# Patient Record
Sex: MALE | Race: WHITE | NOT HISPANIC OR LATINO | Employment: FULL TIME | URBAN - METROPOLITAN AREA
[De-identification: names, ages, dates, MRNs, and addresses within clinical notes are randomized per-mention and may not be internally consistent; named-entity substitution may affect disease eponyms.]

---

## 2017-01-08 ENCOUNTER — SUPERVISING PHYSICIAN REVIEW (OUTPATIENT)
Dept: URGENT CARE | Facility: PHYSICIAN GROUP | Age: 22
End: 2017-01-08

## 2017-06-01 ENCOUNTER — OCCUPATIONAL MEDICINE (OUTPATIENT)
Dept: URGENT CARE | Facility: CLINIC | Age: 22
End: 2017-06-01
Payer: COMMERCIAL

## 2017-06-01 VITALS
TEMPERATURE: 98.6 F | BODY MASS INDEX: 28.14 KG/M2 | OXYGEN SATURATION: 97 % | HEIGHT: 69 IN | HEART RATE: 68 BPM | DIASTOLIC BLOOD PRESSURE: 80 MMHG | SYSTOLIC BLOOD PRESSURE: 106 MMHG | RESPIRATION RATE: 16 BRPM | WEIGHT: 190 LBS

## 2017-06-01 DIAGNOSIS — Z02.1 PRE-EMPLOYMENT DRUG SCREENING: ICD-10-CM

## 2017-06-01 DIAGNOSIS — S61.412A LACERATION OF LEFT HAND WITHOUT FOREIGN BODY, INITIAL ENCOUNTER: ICD-10-CM

## 2017-06-01 LAB
AMP AMPHETAMINE: NORMAL
BREATH ALCOHOL COMMENT: NORMAL
COC COCAINE: NORMAL
INT CON NEG: NEGATIVE
INT CON POS: POSITIVE
MET METHAMPHETAMINES: NORMAL
OPI OPIATES: NORMAL
PCP PHENCYCLIDINE: NORMAL
POC BREATHALIZER: 0 PERCENT (ref 0–0.01)
POC DRUG COMMENT 753798-OCCUPATIONAL HEALTH: NEGATIVE
THC: NORMAL

## 2017-06-01 PROCEDURE — 12001 RPR S/N/AX/GEN/TRNK 2.5CM/<: CPT | Mod: 29 | Performed by: PHYSICIAN ASSISTANT

## 2017-06-01 PROCEDURE — 82075 ASSAY OF BREATH ETHANOL: CPT | Mod: 29 | Performed by: PHYSICIAN ASSISTANT

## 2017-06-01 PROCEDURE — 80305 DRUG TEST PRSMV DIR OPT OBS: CPT | Mod: 29 | Performed by: PHYSICIAN ASSISTANT

## 2017-06-01 NOTE — Clinical Note
"EMPLOYEE’S CLAIM FOR COMPENSATION/ REPORT OF INITIAL TREATMENT  FORM C-4    EMPLOYEE’S CLAIM - PROVIDE ALL INFORMATION REQUESTED   First Name  Ho Last Name  Donna Birthdate                    1995                Sex  male Claim Number   Home Address  [address] Age  21 y.o. 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 (if applicable)  Using a uni-bit to ream out a 3/4 inch hole to a 1 inch hole    How did this injury or occupational disease occur? (Be specific an answer in detail. Use additional sheet if necessary)  At 9:57Am I was using a uni-bit to ream a 3/4 inch hole to a 1 inch hole in a J-Box. Had my right hand on the drill and held the box with channel locks in my left, the bit grabbed the box and spun it out of my channel socks making contact through my glove and cut my hand below my left thumb knuckle.   If you believe that you have an occupational disease, when did you first have knowledge of the disability and it relationship to your employment?  N/A Witnesses to the Accident  N/A      Nature of Injury or Occupational " Disease  Laceration  Part(s) of Body Injured or Affected  Hand (L), ,     I certify that the above is true and correct to the best of my knowledge and that I have provided this information in order to obtain the benefits of [place]  I hereby authorize any physician, chiropractor, surgeon, practitioner, or other person, any hospital, including [facility] or [facility], any medical service organization, any insurance company, or other institution or organization to release to each other, any medical or other information, including benefits paid or payable, pertinent to this injury or disease, except information relative to diagnosis, treatment and/or counseling for AIDS, psychological conditions, alcohol or controlled substances, for which I must give specific authorization.  A Photostat of this authorization shall be as valid as the original.     Date   Place   Employee’s Signature   THIS REPORT MUST BE COMPLETED AND MAILED WITHIN 3 WORKING DAYS OF TREATMENT   Place  [facility]  Name of Facility  [facility]   Date  6/1/2017 Diagnosis  (S61.412A) Laceration of left hand without foreign body, initial encounter Is there evidence the injured employee was under the influence of alcohol and/or another controlled substance at the time of accident?   Hour  11:39 AM Description of Injury or Disease  The encounter diagnosis was Laceration of left hand without foreign body, initial encounter. No   Treatment  full duty at work wearing dressing or glove over area of laceration, wound care discussed, f/u in 11d for suture removal   Have you advised the patient to remain off work five days or more? No   X-Ray Findings      If Yes   From Date  To Date      From information given by the employee, together with medical evidence, can you directly connect this injury or occupational disease as  "job incurred?  Yes If No Full Duty  Yes Modified Duty      Is additional medical care by a physician indicated?  Yes If Modified Duty, Specify any Limitations / Restrictions      Do you know of any previous injury or disease contributing to this condition or occupational disease?                            No   Date  6/1/2017 Print Doctor’s Name Rick Zhang PA-C I certify the employer’s copy of  this form was mailed on:   Address  [address] Use [address]     [place]  [address]    Provider’s Tax ID Number  [id number]  Telephone  Dept: [phone]        e-RICK Lacey PA-C   e-Signature: Dr. Mehdi Delgadillo, Medical Director Degree  DOLORES        ORIGINAL-TREATING PHYSICIAN OR CHIROPRACTOR    PAGE 2-INSURER/TPA    PAGE 3-EMPLOYER    PAGE 4-EMPLOYEE             Form C-4 (rev10/07)              BRIEF DESCRIPTION OF RIGHTS AND BENEFITS  (Pursuant to [place] 616C.050)    Notice of Injury or Occupational Disease (Incident Report Form C-1): If an injury or occupational disease (OD) arises out of and in the  course of employment, you must provide written notice to your employer as soon as practicable, but no later than 7 days after the accident or  OD. Your employer shall maintain a sufficient supply of the required forms.    Claim for Compensation (Form C-4): If medical treatment is sought, the form C-4 is available at the place of initial treatment. A completed  \"Claim for Compensation\" (Form C-4) must be filed within 90 days after an accident or OD. The treating physician or chiropractor must,  within 3 working days after treatment, complete and mail to the employer, the employer's insurer and third-party , the Claim for  Compensation.    Medical Treatment: If you require medical treatment for your on-the-job injury or OD, you may be required to select a physician or  chiropractor from a list provided by your workers’ compensation insurer, if it has contracted " with an Organization for Managed Care (MCO) or  Preferred Provider Organization (PPO) or providers of health care. If your employer has not entered into a contract with an MCO or PPO, you  may select a physician or chiropractor from the Panel of Physicians and Chiropractors. Any medical costs related to your industrial injury or  OD will be paid by your insurer.    Temporary Total Disability (TTD): If your doctor has certified that you are unable to work for a period of at least 5 consecutive days, or 5  cumulative days in a 20-day period, or places restrictions on you that your employer does not accommodate, you may be entitled to TTD  compensation.    Temporary Partial Disability (TPD): If the wage you receive upon reemployment is less than the compensation for TTD to which you are  entitled, the insurer may be required to pay you TPD compensation to make up the difference. TPD can only be paid for a maximum of 24  months.    Permanent Partial Disability (PPD): When your medical condition is stable and there is an indication of a PPD as a result of your injury or  OD, within 30 days, your insurer must arrange for an evaluation by a rating physician or chiropractor to determine the degree of your PPD. The  amount of your PPD award depends on the date of injury, the results of the PPD evaluation and your age and wage.    Permanent Total Disability (PTD): If you are medically certified by a treating physician or chiropractor as permanently and totally disabled  and have been granted a PTD status by your insurer, you are entitled to receive monthly benefits not to exceed 66 2/3% of your average  monthly wage. The amount of your PTD payments is subject to reduction if you previously received a PPD award.    Vocational Rehabilitation Services: You may be eligible for vocational rehabilitation services if you are unable to return to the job due to a  permanent physical impairment or permanent restrictions as a result of  your injury or occupational disease.    Transportation and Per Joanne Reimbursement: You may be eligible for travel expenses and per joanne associated with medical treatment.    Reopening: You may be able to reopen your claim if your condition worsens after claim closure.    Appeal Process: If you disagree with a written determination issued by the insurer or the insurer does not respond to your request, you may  appeal to the Department of Administration, , by following the instructions contained in your determination letter. You must  appeal the determination within 70 days from the date of the determination letter at [address], [address], [place], [place]  [address], or [address], [address], [place], [place] [address]. If you disagree with the  decision, you may appeal to the  Department of Administration, . You must file your appeal within 30 days from the date of the  decision  letter at [address], [address], [place], [place] [address], or [address], [facility] [address], [place] [address]. If you  disagree with a decision of an , you may file a petition for judicial review with the District Court. You must do so within 30  days of the Appeal Officer’s decision. You may be represented by an  at your own expense or you may contact the [facility] for possible  representation.    [place]  for Injured Workers (NAIW): If you disagree with a  decision, you may request that NAIW represent you  without charge at an  Hearing. For information regarding denial of benefits, you may contact the [facility] at: [address], [address], [place] [address], [phone], or [address] [facility] [address], [place] [address], [phone]    To File a Complaint with the Division: If you wish to file a complaint with the  of the Division of Industrial  Relations (DIR),  please contact the Workers’ Compensation Section, [address], [address], [place], [place] [address], telephone [phone], or  [address], [address], [place], [place] [address], telephone [phone].    For assistance with Workers’ Compensation Issues: you may contact the Office of the [facility] Consumer Health Assistance, [address] [place] [address], [address], [place], [place] [address], Toll Free 1-470.230.9708, Web site: http://govcha.[facility].nv., E-mail  Yolanda@[facility].[email or web address].                                                                                                                                                                                                                                   __________________________________________________________________                                                                   _________________                Employee Name / Signature                                                                                                                                                       Date                                                                                                                                                                                                     D-2 (rev. 10/07)

## 2017-06-01 NOTE — PROGRESS NOTES
"Subjective:      Ho Thompson is a 21 y.o. male who presents with Work-Related Injury      DOI: today, 6/1/17  Pt notes today at work was drilling a hole in a metal box, he had his right hand on the drill and was holding the metal box w/ channel locks w/ left hand, the drill bit caught and spun the box out of the channel locks causing a laceration to pt's gloved left hand near left thumb; pt is right hand dominant. Pt notes full normal sensation and motion to left thumb. Denies PMH of left hand/left thumb surgery or prior injury. Denies other employment currently. Chart shows TDAP in 2012.      HPI    ROS       Objective:     /80 mmHg  Pulse 68  Temp(Src) 37 °C (98.6 °F)  Resp 16  Ht 1.753 m (5' 9.02\")  Wt 86.183 kg (190 lb)  BMI 28.05 kg/m2  SpO2 97%     Physical Exam    Gen: AOx3; Head: NC AT; Eyes: PERRLA/EOM; Lungs: NLR; Cardiac: RR by periph pulse exam; Left hand: 1.5cm clean linear laceration over dorsum of base of thumb, no edema/erythema/ecchymosis, full AROM and full resisted strength in flexion/extension of thumb; Neuro: normal sensation to light touch on ulnar/radial aspect of digit, brisk cap refill      Procedure: Laceration Repair   -Risks including bleeding, nerve damage, infection, and poor cosmetic outcome discussed at length. Benefits and alternatives discussed.   -Sterile technique throughout   -Local anesthesia with 2% lidocaine   -Closed with 3# 4-0 Nylon interrupted sutures with good wound approximation   -Polysporin and dressing placed   -Patient tolerated well      Assessment/Plan:     1. Laceration of left hand without foreign body, initial encounter  full duty at work wearing dressing or glove over area of laceration, wound care discussed, f/u in 11d for suture removal         "

## 2017-06-01 NOTE — Clinical Note
[facility]   [address]  [place] [address]  Phone: [phone] - Fax: [phone]        [facility] Progress Report and Disability Certification  Date of Service: 6/1/2017   No Show:  No  Date / Time of Next Visit: 6/12/2017   Claim Information   Patient Name: Ho Thompson  Claim Number:     Employer: [employer]  Date of Injury: 6/1/2017     Insurer / TPA: [employer]  ID / SSN:     Occupation: Electric Apprentice  Diagnosis: The encounter diagnosis was Laceration of left hand without foreign body, initial encounter.    Medical Information   Related to Industrial Injury? Yes    Subjective Complaints:  DOI: today, 6/1/17  Pt notes today at work was drilling a hole in a metal box, he had his right hand on the drill and was holding the metal box w/ channel locks w/ left hand, the drill bit caught and spun the box out of the channel locks causing a laceration to pt's gloved left hand near left thumb; pt is right hand dominant. Pt notes full normal sensation and motion to left thumb. Denies PMH of left hand/left thumb surgery or prior injury. Denies other employment currently. Chart shows TDAP in 2012.    Objective Findings: Gen: AOx3; Head: NC AT; Eyes: PERRLA/EOM; Lungs: NLR; Cardiac: RR by periph pulse exam; Left hand: 1.5cm clean linear laceration over dorsum of base of thumb, no edema/erythema/ecchymosis, full AROM and full resisted strength in flexion/extension of thumb; Neuro: normal sensation to light touch on ulnar/radial aspect of digit, brisk cap refill     Pre-Existing Condition(s):     Assessment:   Initial Visit    Status: Additional Care Required  Permanent Disability:No    Plan:   Comments:full duty at work wearing dressing or glove over area of laceration, wound care discussed, f/u in 11d for suture removal      Diagnostics:      Comments:  full duty at work wearing dressing or glove over area of laceration, wound care discussed,  f/u in 11d for suture removal     Disability Information   Status: Released to Full Duty    From:  6/1/2017  Through: 6/12/2017 Restrictions are: Temporary   Physical Restrictions   Sitting:    Standing:    Stooping:    Bending:      Squatting:    Walking:    Climbing:    Pushing:      Pulling:    Other:    Reaching Above Shoulder (L):   Reaching Above Shoulder (R):       Reaching Below Shoulder (L):    Reaching Below Shoulder (R):      Not to exceed Weight Limits   Carrying(hrs):   Weight Limit(lb):   Lifting(hrs):   Weight  Limit(lb):     Comments: full duty at work wearing dressing or glove over area of laceration, wound care discussed, f/u in 11d for suture removal     Repetitive Actions   Hands: i.e. Fine Manipulations from Grasping:     Feet: i.e. Operating Foot Controls:     Driving / Operate Machinery:     Physician Name: Rick Zhang PA-C Physician Signature: RICK Boston PA-C e-Signature: Dr. Mehdi Delgadillo, Medical Director   Clinic Name / Location: [facility] [address] [address] Clinic Phone Number: Dept: [phone]   Appointment Time: 10:45 Am Visit Start Time: 11:39 AM   Check-In Time:  10:48 Am Visit Discharge Time:  6/1/17   Original-Treating Physician or Chiropractor    Page 2-Insurer/TPA    Page 3-Employer    Page 4-Employee

## 2017-06-01 NOTE — MR AVS SNAPSHOT
"        Ho Thompson   2017 10:45 AM   Occupational Medicine   MRN: [id number]    Department:  [facility]   Dept Phone:  [phone]    Description:  Male : 1995   Provider:  Rick Zhang PA-C           Reason for Visit     Work-Related Injury LAC LT thumb DOI 2017 [employer].  PT was drilling a hole in a metal box when the drillbit got stuck and yanked the box out of his hand and caused it to spin and hit his thumb cutting him through his glove.      Allergies as of 2017     No Known Allergies      You were diagnosed with     Laceration of left hand without foreign body, initial encounter   [[id number]]         Vital Signs     Blood Pressure Pulse Temperature Respirations Height Weight    106/80 mmHg 68 37 °C (98.6 °F) 16 1.753 m (5' 9.02\") 86.183 kg (190 lb)    Body Mass Index Oxygen Saturation Smoking Status             28.05 kg/m2 97% Never Smoker          Basic Information     Date Of Birth Sex Race Ethnicity Preferred Language    1995 Male White Non- English      Your appointments     2017  5:00 PM   Workers Compensation with [facility]   [facility] ([facility])    [address] [address] [phone]              Problem List              ICD-10-CM Priority Class Noted - Resolved    Pre-syncope R55 High  2014 - Present    Vaso-vagal reaction R55 High  2014 - Present      Health Maintenance        Date Due Completion Dates    IMM HEP B VACCINE (1 of 3 - Primary Series) 1993 ---    IMM HEP A VACCINE (1 of 2 - Standard Series) 1994 ---    IMM HPV VACCINE (1 of 3 - Male 3 Dose Series) 2004 ---    IMM VARICELLA (CHICKENPOX) VACCINE (1 of 2 - 2 Dose Adolescent Series) 2006 ---    IMM DTaP/Tdap/Td Vaccine (1 - Tdap) 2012 ---            Current Immunizations     No immunizations on file.      Below and/or attached are the medications your provider expects you to " take. Review all of your home medications and newly ordered medications with your provider and/or pharmacist. Follow medication instructions as directed by your provider and/or pharmacist. Please keep your medication list with you and share with your provider. Update the information when medications are discontinued, doses are changed, or new medications (including over-the-counter products) are added; and carry medication information at all times in the event of emergency situations     Allergies:  No Known Allergies          Medications  Valid as of: June 01, 2017 - 12:25 PM    Generic Name Brand Name Tablet Size Instructions for use    Cetirizine HCl (Tab) ZYRTEC 10 MG Take  by mouth every day.        Ibuprofen   Take  by mouth.        .                 Medicines prescribed today were sent to:     [id number] - [place], [place] - [address]    [address] [place] [address]    Phone: [phone] Fax: [phone]    Open 24 Hours?: No      Medication refill instructions:       If your prescription bottle indicates you have medication refills left, it is not necessary to call your provider’s office. Please contact your pharmacy and they will refill your medication.    If your prescription bottle indicates you do not have any refills left, you may request refills at any time through one of the following ways: The online [employer] system (except Urgent Care), by calling your provider’s office, or by asking your pharmacy to contact your provider’s office with a refill request. Medication refills are processed only during regular business hours and may not be available until the next business day. Your provider may request additional information or to have a follow-up visit with you prior to refilling your medication.   *Please Note: Medication refills are assigned a new Rx number when refilled electronically. Your pharmacy may indicate that no refills were authorized even though a new prescription for the  same medication is available at the pharmacy. Please request the medicine by name with the pharmacy before contacting your provider for a refill.           MyChart Status: Patient Declined        Quit Tobacco Information     Do you want to quit using tobacco?    Quitting tobacco decreases risks of cancer, heart and lung disease, increases life expectancy, improves sense of taste and smell, and increases spending money, among other benefits.    If you are thinking about quitting, we can help.  • [facility] Quit Tobacco Program: 433.207.6243  o Program occurs weekly for four weeks and includes pharmacist consultation on products to support quitting smoking or chewing tobacco. A provider referral is needed for pharmacist consultation.  • Tobacco Users Help Hotline: 3-482-QUIT-NOW (408-5914) or https://[place].quitlogix.org/  o Free, confidential telephone and online coaching for [place] residents. Sessions are designed on a schedule that is convenient for you. Eligible clients receive free nicotine replacement therapy.  • Nationally: www.smokefree.gov  o Information and professional assistance to support both immediate and long-term needs as you become, and remain, a non-smoker. Smokefree.gov allows you to choose the help that best fits your needs.

## 2017-06-12 ENCOUNTER — OCCUPATIONAL MEDICINE (OUTPATIENT)
Dept: URGENT CARE | Facility: CLINIC | Age: 22
End: 2017-06-12
Payer: COMMERCIAL

## 2017-06-12 VITALS
DIASTOLIC BLOOD PRESSURE: 84 MMHG | HEART RATE: 78 BPM | OXYGEN SATURATION: 98 % | WEIGHT: 190 LBS | RESPIRATION RATE: 16 BRPM | HEIGHT: 69 IN | BODY MASS INDEX: 28.14 KG/M2 | TEMPERATURE: 98.1 F | SYSTOLIC BLOOD PRESSURE: 124 MMHG

## 2017-06-12 DIAGNOSIS — S61.412A LACERATION OF LEFT HAND WITHOUT FOREIGN BODY, INITIAL ENCOUNTER: ICD-10-CM

## 2017-06-12 PROCEDURE — 99202 OFFICE O/P NEW SF 15 MIN: CPT | Performed by: FAMILY MEDICINE

## 2017-06-12 ASSESSMENT — ENCOUNTER SYMPTOMS
NAUSEA: 0
CHILLS: 0
VOMITING: 0
SHORTNESS OF BREATH: 0
FEVER: 0

## 2017-06-12 NOTE — MR AVS SNAPSHOT
"        Ho Thompson   2017 5:00 PM   Occupational Medicine   MRN: [id number]    Department:  [facility]   Dept Phone:  [phone]    Description:  Male : 1995   Provider:  Jones Felder M.D.           Reason for Visit     Work-Related Injury WC F/V       Allergies as of 2017     No Known Allergies      You were diagnosed with     Laceration of left hand without foreign body, initial encounter   [605803]         Vital Signs     Blood Pressure Pulse Temperature Respirations Height Weight    124/84 mmHg 78 36.7 °C (98.1 °F) 16 1.753 m (5' 9\") 86.183 kg (190 lb)    Body Mass Index Oxygen Saturation Smoking Status             28.05 kg/m2 98% Never Smoker          Basic Information     Date Of Birth Sex Race Ethnicity Preferred Language    1995 Male White Non- English      Problem List              ICD-10-CM Priority Class Noted - Resolved    Pre-syncope R55 High  2014 - Present    Vaso-vagal reaction R55 High  2014 - Present      Health Maintenance        Date Due Completion Dates    IMM HEP B VACCINE (1 of 3 - Primary Series) 1993 ---    IMM HEP A VACCINE (1 of 2 - Standard Series) 1994 ---    IMM HPV VACCINE (1 of 3 - Male 3 Dose Series) 2004 ---    IMM VARICELLA (CHICKENPOX) VACCINE (1 of 2 - 2 Dose Adolescent Series) 2006 ---    IMM DTaP/Tdap/Td Vaccine (1 - Tdap) 2012 ---            Current Immunizations     No immunizations on file.      Below and/or attached are the medications your provider expects you to take. Review all of your home medications and newly ordered medications with your provider and/or pharmacist. Follow medication instructions as directed by your provider and/or pharmacist. Please keep your medication list with you and share with your provider. Update the information when medications are discontinued, doses are changed, or new medications (including over-the-counter products) are added; and carry medication " information at all times in the event of emergency situations     Allergies:  No Known Allergies          Medications  Valid as of: June 12, 2017 -  5:09 PM    Generic Name Brand Name Tablet Size Instructions for use    Cetirizine HCl (Tab) ZYRTEC 10 MG Take  by mouth every day.        Ibuprofen   Take  by mouth.        .                 Medicines prescribed today were sent to:     [facility]Y #[id number] - [place], [place] - [address]    [address] [place] [address]    Phone: [phone] Fax: [phone]    Open 24 Hours?: No      Medication refill instructions:       If your prescription bottle indicates you have medication refills left, it is not necessary to call your provider’s office. Please contact your pharmacy and they will refill your medication.    If your prescription bottle indicates you do not have any refills left, you may request refills at any time through one of the following ways: The online [employer] system (except Urgent Care), by calling your provider’s office, or by asking your pharmacy to contact your provider’s office with a refill request. Medication refills are processed only during regular business hours and may not be available until the next business day. Your provider may request additional information or to have a follow-up visit with you prior to refilling your medication.   *Please Note: Medication refills are assigned a new Rx number when refilled electronically. Your pharmacy may indicate that no refills were authorized even though a new prescription for the same medication is available at the pharmacy. Please request the medicine by name with the pharmacy before contacting your provider for a refill.           MyChart Status: Patient Declined        Quit Tobacco Information     Do you want to quit using tobacco?    Quitting tobacco decreases risks of cancer, heart and lung disease, increases life expectancy, improves sense of taste and smell, and increases spending money, among  other benefits.    If you are thinking about quitting, we can help.  • [facility] Quit Tobacco Program: 864.814.4577  o Program occurs weekly for four weeks and includes pharmacist consultation on products to support quitting smoking or chewing tobacco. A provider referral is needed for pharmacist consultation.  • Tobacco Users Help Hotline: 1-800-QUIT-NOW (858-7684) or https://[place].quitlogix.org/  o Free, confidential telephone and online coaching for [place] residents. Sessions are designed on a schedule that is convenient for you. Eligible clients receive free nicotine replacement therapy.  • Nationally: www.smokefree.gov  o Information and professional assistance to support both immediate and long-term needs as you become, and remain, a non-smoker. Smokefree.gov allows you to choose the help that best fits your needs.

## 2017-06-12 NOTE — Clinical Note
[facility] [id number] [place] [address]  Phone: [phone] - Fax: [phone]        [facility] Progress Report and Disability Certification  Date of Service: 6/12/2017   No Show:  No  Date / Time of Next Visit:     Claim Information   Patient Name: Ho Thompson  Claim Number:     Employer: [employer] Date of Injury: 6/1/2017     Insurer / TPA: [employer]  ID / SSN:     Occupation: Electric Apprentice  Diagnosis: The encounter diagnosis was Laceration of left hand without foreign body, initial encounter.    Medical Information   Related to Industrial Injury? Yes    Subjective Complaints:  Removal of sutures, healed well   Objective Findings: Hand neurovascularly intact, wound healed   Pre-Existing Condition(s): n/a   Assessment:   Condition Improved    Status: Discharged /  MMI  Permanent Disability:No    Plan:   Comments:wound care, bandaid for 24-48 hrs while working    Diagnostics:      Comments:  Healed well    Disability Information   Status: Released to Full Duty    From:     Through:   Restrictions are:     Physical Restrictions   Sitting:    Standing:    Stooping:    Bending:      Squatting:    Walking:    Climbing:    Pushing:      Pulling:    Other:    Reaching Above Shoulder (L):   Reaching Above Shoulder (R):       Reaching Below Shoulder (L):    Reaching Below Shoulder (R):      Not to exceed Weight Limits   Carrying(hrs):   Weight Limit(lb):   Lifting(hrs):   Weight  Limit(lb):     Comments:      Repetitive Actions   Hands: i.e. Fine Manipulations from Grasping:     Feet: i.e. Operating Foot Controls:     Driving / Operate Machinery:     Physician Name: Jones Felder M.D. Physician Signature: JONES Preciado M.D. e-Signature: Dr. Mehdi Delgadillo, Medical Director   Clinic Name / Location: [id number] [place] [address] Clinic Phone Number: Dept: [phone]   Appointment  Time: 5:00 Pm Visit Start Time: 4:37 PM   Check-In Time:  4:30 Pm Visit Discharge Time: 5:09 Pm   Original-Treating Physician or Chiropractor    Page 2-Insurer/TPA    Page 3-Employer    Page 4-Employee

## 2017-06-12 NOTE — PROGRESS NOTES
"Subjective:      Ho Thompson is a 21 y.o. male who presents with Work-Related Injury            Suture / Staple Removal  The sutures were placed 11 to 14 days ago. He tried nothing since the wound repair. The treatment provided significant relief. His temperature was unmeasured prior to arrival. There has been no drainage from the wound. There is no redness present. There is no swelling present. The pain has no pain. He has no difficulty moving the affected extremity or digit.       Review of Systems   Constitutional: Negative for fever and chills.   Respiratory: Negative for shortness of breath.    Cardiovascular: Negative for chest pain.   Gastrointestinal: Negative for nausea and vomiting.     PMH:  has a past medical history of Vaso-vagal reaction (9/30/2014).  MEDS:   Current outpatient prescriptions:   •  IBUPROFEN PO, Take  by mouth., Disp: , Rfl:   •  cetirizine (ZYRTEC) 10 MG TABS, Take  by mouth every day., Disp: , Rfl:   ALLERGIES: No Known Allergies  SURGHX: History reviewed. No pertinent past surgical history.  SOCHX:  reports that he has never smoked. His smokeless tobacco use includes Chew. He reports that he does not drink alcohol or use illicit drugs.  FH: Family history was reviewed, no pertinent findings to report       Objective:     /84 mmHg  Pulse 78  Temp(Src) 36.7 °C (98.1 °F)  Resp 16  Ht 1.753 m (5' 9\")  Wt 86.183 kg (190 lb)  BMI 28.05 kg/m2  SpO2 98%     Physical Exam   Constitutional: He appears well-developed.   Pulmonary/Chest: Effort normal.   Neurological: He is alert.   Skin: Skin is warm and dry. Laceration noted.        Psychiatric: He has a normal mood and affect.               Assessment/Plan:     1. Laceration of left hand without foreign body, initial encounter       Healed laceration of the left hand  Removal of 3 sutures without complication  Recommend wearing a Band-Aid for the next 2 days to avoid risk of infection    Also, mentioned issues with " numbness and tingling in the right hand which sounds like it may be cervical in nature since he has symptoms at night versus carpal tunnel syndrome  This is not work-related and he has seen a physical therapist at the [facility], advised him to work on some cervical spine exercises and perhaps consider a nighttime splint for his wrist to see if either of them helped his symptoms  Otherwise, he will need to reschedule since his right hand issue is not work-related

## 2017-08-29 ENCOUNTER — OFFICE VISIT (OUTPATIENT)
Dept: URGENT CARE | Facility: CLINIC | Age: 22
End: 2017-08-29
Payer: COMMERCIAL

## 2017-08-29 VITALS
WEIGHT: 185 LBS | TEMPERATURE: 99 F | OXYGEN SATURATION: 98 % | BODY MASS INDEX: 26.48 KG/M2 | HEART RATE: 70 BPM | DIASTOLIC BLOOD PRESSURE: 70 MMHG | HEIGHT: 70 IN | SYSTOLIC BLOOD PRESSURE: 128 MMHG

## 2017-08-29 DIAGNOSIS — M25.561 ACUTE PAIN OF RIGHT KNEE: ICD-10-CM

## 2017-08-29 DIAGNOSIS — G56.01 RIGHT CARPAL TUNNEL SYNDROME: ICD-10-CM

## 2017-08-29 PROCEDURE — 99214 OFFICE O/P EST MOD 30 MIN: CPT | Performed by: FAMILY MEDICINE

## 2017-08-29 PROCEDURE — L3807 WHFO W/O JOINTS PRE CST: HCPCS | Performed by: FAMILY MEDICINE

## 2017-08-29 RX ORDER — PREDNISONE 20 MG/1
TABLET ORAL
Qty: 9 TAB | Refills: 0 | Status: SHIPPED | OUTPATIENT
Start: 2017-08-29 | End: 2017-11-17

## 2017-08-29 NOTE — LETTER
August 29, 2017         Patient: Ho Thompson   YOB: 1995   Date of Visit: 8/29/2017           To Whom it May Concern:    Ho Thompson was seen in my clinic on 8/29/2017.     Please excuse from school for 8/29/17 due to medical condition.    If you have any questions or concerns, please don't hesitate to call.        Sincerely,           Lemuel Catalan M.D.  Electronically Signed

## 2017-08-30 NOTE — PROGRESS NOTES
"Chief Complaint:    Chief Complaint   Patient presents with   • Knee Pain     \"does not know why\"x1week   • Wrist Pain     numbness on and off for 3years        History of Present Illness:    These are new problems.    For 1 week, has pain in right knee. Feels stiff, especially when going up stairs. No injury or trauma. Tried Ibuprofen with sub-optimal help.    For 3 years, has intermittent pain in right wrist and numbness and tingling feeling in right 1st-4th fingers. Notices more when wakes up in AM.    Does physical work - construction.      Review of Systems:    Constitutional: Negative for fever, chills, and diaphoresis.   Eyes: Negative for change in vision, photophobia, pain, redness, and discharge.  ENT: Negative for ear pain, ear discharge, hearing loss, tinnitus, nasal congestion, nosebleeds, and sore throat.    Respiratory: Negative for cough, hemoptysis, sputum production, shortness of breath, wheezing, and stridor.    Cardiovascular: Negative for chest pain, palpitations, orthopnea, claudication, leg swelling, and PND.   Gastrointestinal: Negative for abdominal pain, nausea, vomiting, diarrhea, constipation, blood in stool, and melena.   Genitourinary: Negative for dysuria, urinary urgency, urinary frequency, hematuria, and flank pain.   Musculoskeletal: See HPI.   Skin: Negative for rash and itching.   Neurological: Negative for dizziness, tingling, tremors, sensory change, speech change, focal weakness, seizures, loss of consciousness, and headaches.   Endo: Negative for polydipsia.   Heme: Does not bruise/bleed easily.   Psychiatric/Behavioral: Negative for depression, suicidal ideas, hallucinations, memory loss and substance abuse. The patient is not nervous/anxious and does not have insomnia.      Past Medical History:    Past Medical History:   Diagnosis Date   • Vaso-vagal reaction 9/30/2014       Past Surgical History:    No past surgical history on file.    Social History:    Social History " "    Social History   • Marital status: Single     Spouse name: N/A   • Number of children: N/A   • Years of education: N/A     Social History Main Topics   • Smoking status: Never Smoker   • Smokeless tobacco: Former User     Types: Chew   • Alcohol use No   • Drug use: No   • Sexual activity: Yes     Other Topics Concern   • Not on file     Social History Narrative   • No narrative on file       Family History:    History reviewed. No pertinent family history.    Medications:    Current Outpatient Prescriptions on File Prior to Visit   Medication Sig Dispense Refill   • cetirizine (ZYRTEC) 10 MG TABS Take  by mouth every day.     • IBUPROFEN PO Take  by mouth.       No current facility-administered medications on file prior to visit.        Allergies:    No Known Allergies      Vitals:    Vitals:    08/29/17 1706   BP: 128/70   Pulse: 70   Temp: 37.2 °C (99 °F)   SpO2: 98%   Weight: 83.9 kg (185 lb)   Height: 1.778 m (5' 10\")       Physical Exam:    Constitutional: Vital signs reviewed. Appears well-developed and well-nourished. No acute distress.   Eyes: Sclera white, conjunctivae clear.  ENT: External ears normal. Hearing normal.  Cardiovascular: Peripheral pulses 2+. No edema.  Pulmonary/Chest: Respirations non-labored.  Musculoskeletal: Mildly decreased right knee and right wrist range of motion due to pain. No tenderness to palpation. No muscular atrophy or weakness.  Neurological: Alert and oriented to person, place, and time. Muscle tone normal. Coordination normal. Light touch and sensation normal. Negative Tinel's and Phalen's signs.  Skin: No rashes or lesions. Warm, dry, normal turgor.  Psychiatric: Normal mood and affect. Behavior is normal. Judgment and thought content normal.       Assessment / Plan:    1. Right carpal tunnel syndrome  - predniSONE (DELTASONE) 20 MG Tab; 2 TABS ONCE A DAY ON DAYS 1-3, 1 TAB ONCE A DAY ON DAYS 4-6. TAKE WITH FOOD.  Dispense: 9 Tab; Refill: 0    2. Acute pain of right " knee  - predniSONE (DELTASONE) 20 MG Tab; 2 TABS ONCE A DAY ON DAYS 1-3, 1 TAB ONCE A DAY ON DAYS 4-6. TAKE WITH FOOD.  Dispense: 9 Tab; Refill: 0      Discussed with him DDX and management options.    Carpal Tunnel Syndrome info given and discussed.    Rec'd relative rest.    Right wrist splint provided to use prn. Advised to take off occl to prevent stiffness and get ROM in wrist.    Declines Toradol IM.    Agreeable to medication prescribed for anti-inflammatory effect.    He has a PPO and he will probably see [facility] if not better with above or any persisting problems.    May return to urgent care if needed.

## 2017-11-17 ENCOUNTER — OFFICE VISIT (OUTPATIENT)
Dept: URGENT CARE | Facility: CLINIC | Age: 22
End: 2017-11-17
Payer: COMMERCIAL

## 2017-11-17 VITALS
DIASTOLIC BLOOD PRESSURE: 84 MMHG | TEMPERATURE: 98.4 F | OXYGEN SATURATION: 98 % | SYSTOLIC BLOOD PRESSURE: 122 MMHG | BODY MASS INDEX: 27.92 KG/M2 | HEART RATE: 82 BPM | WEIGHT: 195 LBS | HEIGHT: 70 IN

## 2017-11-17 DIAGNOSIS — H10.32 ACUTE CONJUNCTIVITIS OF LEFT EYE, UNSPECIFIED ACUTE CONJUNCTIVITIS TYPE: ICD-10-CM

## 2017-11-17 PROCEDURE — 99214 OFFICE O/P EST MOD 30 MIN: CPT | Performed by: NURSE PRACTITIONER

## 2017-11-17 RX ORDER — POLYMYXIN B SULFATE AND TRIMETHOPRIM 1; 10000 MG/ML; [USP'U]/ML
1 SOLUTION OPHTHALMIC 4 TIMES DAILY
Qty: 1 BOTTLE | Refills: 0 | Status: SHIPPED | OUTPATIENT
Start: 2017-11-17 | End: 2017-11-22

## 2017-11-18 NOTE — PROGRESS NOTES
Chief Complaint   Patient presents with   • Eye Problem     X today, Left eye, Red, Discharge        HISTORY OF PRESENT ILLNESS: Patient is a 22 y.o. male who presents to urgent care today with complaints of left eye redness and irritation today. Admits to associated tearing and crusting. He denies eye pain, photophobia, changes in vision, or foreign body sensation. He does admit feeling something fly into his eye five days ago, was irritated, but that only lasted a short time, he has not had symptoms since. He has tried OTC eye drops today for symptom relief. He does not wear contacts or glasses. He denies URI symptoms.       Patient Active Problem List    Diagnosis Date Noted   • Pre-syncope 09/30/2014     Priority: High   • Vaso-vagal reaction 09/30/2014     Priority: High       Allergies:Patient has no known allergies.    Current Outpatient Prescriptions Ordered in [facility]   Medication Sig Dispense Refill   • cetirizine (ZYRTEC) 10 MG TABS Take  by mouth every day.     • IBUPROFEN PO Take  by mouth.       No current Epic-ordered facility-administered medications on file.        Past Medical History:   Diagnosis Date   • Vaso-vagal reaction 9/30/2014       Social History   Substance Use Topics   • Smoking status: Never Smoker   • Smokeless tobacco: Former User     Types: Chew   • Alcohol use No       No family status information on file.   History reviewed. No pertinent family history.    ROS:  Review of Systems   Constitutional: Negative for fever, chills, weight loss, malaise, and fatigue.   HENT: Negative for ear pain, nosebleeds, congestion, sore throat and neck pain.    Eyes: Positive for left eye redness, irritation, drainage, and crusting today. Negative for vision changes, photophobia, eye pain, or foreign body sensation.   Neuro: Negative for headache, sensory changes, weakness, seizure, LOC.   Cardiovascular: Negative for chest pain, palpitations, orthopnea and leg swelling.   Respiratory: Negative for  "cough, sputum production, shortness of breath and wheezing.   Gastrointestinal: Negative for abdominal pain, nausea, vomiting or diarrhea.   Musculoskeletal: Negative for falls, neck pain, back pain, joint pain, myalgias.   Skin: Negative for rash, diaphoresis.     Exam:  Blood pressure 122/84, pulse 82, temperature 36.9 °C (98.4 °F), height 1.778 m (5' 10\"), weight 88.5 kg (195 lb), SpO2 98 %.  General: well-nourished, well-developed male in NAD  Head: normocephalic, atraumatic  Eyes: PERRLA, acuity grossly intact, lids normal. Left conjunctiva is mildly injected with scant amount of crusting noted to lashes. Florescence exam is negative for uptake.  Ears: normal shape and symmetry, no tenderness, no discharge. External canals are without any significant edema or erythema. Tympanic membranes are without any inflammation, no effusion. Gross auditory acuity is intact.  Nose: symmetrical without tenderness, no discharge.  Mouth/Throat: reasonable hygiene, no erythema, exudates or tonsillar enlargement.  Neck: no masses, range of motion within normal limits, no tracheal deviation. No obvious thyroid enlargement.   Lymph: no cervical adenopathy. No supraclavicular adenopathy.   Neuro: alert and oriented. Cranial nerves 1-12 grossly intact. No sensory deficit.   Cardiovascular: regular rate and rhythm. No edema.  Pulmonary: no distress. Chest is symmetrical with respiration, no wheezes, crackles, or rhonchi.   Musculoskeletal: no clubbing, appropriate muscle tone, gait is stable.  Skin: warm, dry, intact, no clubbing, no cyanosis, no rashes.   Psych: appropriate mood, affect, judgement.         Assessment/Plan:  1. Acute conjunctivitis of left eye, unspecified acute conjunctivitis type  polymixin-trimethoprim (POLYTRIM) 60652-5.1 UNIT/ML-% Solution       No evidence of corneal abrasion or foreign body. Polytrim as directed for suspected conjunctivitis.  Supportive care, differential diagnoses, and indications for " immediate follow-up discussed with patient.   Pathogenesis of diagnosis discussed including typical length and natural progression.   Instructed to return to clinic or nearest emergency department for any change in condition, further concerns, or worsening of symptoms.  Patient states understanding of the plan of care and discharge instructions.  Instructed to make an appointment, for follow up, with their primary care provider.        Please note that this dictation was created using voice recognition software. I have made every reasonable attempt to correct obvious errors, but I expect that there are errors of grammar and possibly content that I did not discover before finalizing the note.      BERTHA Anderson.

## 2020-05-05 ENCOUNTER — HOSPITAL ENCOUNTER (OUTPATIENT)
Dept: LAB | Facility: MEDICAL CENTER | Age: 25
End: 2020-05-05
Attending: OBSTETRICS & GYNECOLOGY
Payer: COMMERCIAL

## 2020-05-06 ENCOUNTER — HOSPITAL ENCOUNTER (OUTPATIENT)
Facility: MEDICAL CENTER | Age: 25
End: 2020-05-06
Attending: OBSTETRICS & GYNECOLOGY
Payer: COMMERCIAL

## 2020-05-06 PROCEDURE — 36415 COLL VENOUS BLD VENIPUNCTURE: CPT

## 2020-05-06 PROCEDURE — 88262 CHROMOSOME ANALYSIS 15-20: CPT

## 2020-05-15 LAB
KARYOTYP BLD/T: NORMAL
PATHOLOGY STUDY: NORMAL

## 2020-11-30 ENCOUNTER — HOSPITAL ENCOUNTER (EMERGENCY)
Facility: MEDICAL CENTER | Age: 25
End: 2020-11-30
Attending: EMERGENCY MEDICINE
Payer: COMMERCIAL

## 2020-11-30 VITALS
RESPIRATION RATE: 15 BRPM | SYSTOLIC BLOOD PRESSURE: 143 MMHG | HEART RATE: 70 BPM | BODY MASS INDEX: 28.64 KG/M2 | OXYGEN SATURATION: 97 % | HEIGHT: 72 IN | WEIGHT: 211.42 LBS | TEMPERATURE: 97.8 F | DIASTOLIC BLOOD PRESSURE: 101 MMHG

## 2020-11-30 DIAGNOSIS — Z77.098 EXPOSURE TO CHEMICAL INHALATION: ICD-10-CM

## 2020-11-30 PROCEDURE — 99282 EMERGENCY DEPT VISIT SF MDM: CPT

## 2020-11-30 NOTE — LETTER
FORM C-4:  EMPLOYEE’S CLAIM FOR COMPENSATION/ REPORT OF INITIAL TREATMENT  EMPLOYEE’S CLAIM - PROVIDE ALL INFORMATION REQUESTED   First Name Ho Last Name Donna Birthdate 1995  Sex male Claim Number   Home Address [address]   [facility]             [place] [address]                                   Age  25 y.o. Height  1.829 m (6') Weight  95.9 kg (211 lb 6.7 oz) [facility]     Mailing Address [address]  [facility]              [place] [address] Telephone  [phone] (home)  Primary Language Spoken  English   Insurer   Third Party   [employer] Employee's Occupation (Job Title) When Injury or Occupational Disease Occurred     Employer's Name [employer] Telephone [phone]    Employer Address [address] [facility] [[address]] Zip [address]   Date of Injury  11/30/20       Hour of Injury  10:50AM Date Employer Notified  11/30/2020 Last Day of Work after Injury or Occupational Disease  11/30/20 Supervisor to Whom Injury Reported  Larry Wisdom   Address or Location of Accident (if applicable) [address] [place] [address]   What were you doing at the time of accident? (if applicable)  Pulling out wire from old panel's near accident   How did this injury or occupational disease occur? Be specific and answer in detail. Use additional sheet if necessary)  Chemical from an improper disposal of an old refridgerator. Myself + coworkers could smell fuses + could feel burning in the eye's +throat. Vaccated scene immediately but still togete the burn + coughing.    If you believe that you have an occupational disease, when did you first have knowledge of the disability and it relationship to your employment?  Witnesses to the Accident  Cornelio Carr   Nature of Injury or Occupational Disease  Refridgerator chemical exposure Part(s) of Body Injured or Affected  , , Eye's throat Lungs   I  CERTIFY THAT THE ABOVE IS TRUE AND CORRECT TO THE BEST OF MY KNOWLEDGE AND THAT I HAVE PROVIDED THIS INFORMATION IN ORDER TO OBTAIN THE BENEFITS OF [place] CHAPTER 617 OF [place]).  I HEREBY AUTHORIZE ANY PHYSICIAN, CHIROPRACTOR, SURGEON, PRACTITIONER, OR OTHER PERSON, ANY HOSPITAL, INCLUDING [facility] OR [facility], ANY MEDICAL SERVICE ORGANIZATION, ANY INSURANCE COMPANY, OR OTHER INSTITUTION OR ORGANIZATION TO RELEASE TO EACH OTHER, ANY MEDICAL OR OTHER INFORMATION, INCLUDING BENEFITS PAID OR PAYABLE, PERTINENT TO THIS INJURY OR DISEASE, EXCEPT INFORMATION RELATIVE TO DIAGNOSIS, TREATMENT AND/OR COUNSELING FOR AIDS, PSYCHOLOGICAL CONDITIONS, ALCOHOL OR CONTROLLED SUBSTANCES, FOR WHICH I MUST GIVE SPECIFIC AUTHORIZATION.  A PHOTOSTAT OF THIS AUTHORIZATION SHALL BE AS VALID AS THE ORIGINAL.  Date                                      Place                                                                             Employee’s Signature   THIS REPORT MUST BE COMPLETED AND MAILED WITHIN 3 WORKING DAYS OF TREATMENT   Place [facility], EMERGENCY DEPT                       Name of Facility [facility]   Date  11/30/2020 Diagnosis  No diagnosis found. Is there evidence the injured employee was under the influence of alcohol and/or another controlled substance at the time of accident?   Hour  1:48 PM Description of Injury or Disease       Treatment     Have you advised the patient to remain off work five days or more?             X-Ray Findings    If Yes   From Date    To Date      From information given by the employee, together with medical evidence, can you directly connect this injury or occupational disease as job incurred?   If No, is employee capable of: Full Duty    Modified Duty      Is additional medical care by a physician indicated?   If Modified Duty, Specify  "any Limitations / Restrictions       Do you know of any previous injury or disease contributing to this condition or occupational disease?      Date 11/30/2020 Print Doctor’s Name   I certify the employer’s copy of this form was mailed on:   Address [address] [id number] [phone] INSURER’S USE ONLY   Provider’s Tax ID Number [id number] Telephone Dept: [phone]    Doctor’s Signature   Degree        Form C-4 (rev.10/07)                                                                         BRIEF DESCRIPTION OF RIGHTS AND BENEFITS  ([place]ursuant to [place] 616C.050)    Notice of Injury or Occupational Disease (Incident Report Form C-1): If an injury or occupational disease (OD) arises out of and in the course of employment, you must provide written notice to your employer as soon as practicable, but no later than 7 days after the accident or OD. Your employer shall maintain a sufficient supply of the required forms.    Claim for Compensation (Form C-4): If medical treatment is sought, the form C-4 is available at the place of initial treatment. A completed \"Claim for Compensation\" (Form C-4) must be filed within 90 days after an accident or OD. The treating physician or chiropractor must, within 3 working days after treatment, complete and mail to the employer, the employer's insurer and third-party , the Claim for Compensation.    Medical Treatment: If you require medical treatment for your on-the-job injury or OD, you may be required to select a physician or chiropractor from a list provided by your workers’ compensation insurer, if it has contracted with an Organization for Managed Care (MCO) or Preferred Provider Organization (PPO) or providers of health care. If your employer has not entered into a contract with an MCO or PPO, you may select a physician or chiropractor from the Panel of Physicians and Chiropractors. Any medical costs related to your industrial injury or OD will be " paid by your insurer.    Temporary Total Disability (TTD): If your doctor has certified that you are unable to work for a period of at least 5 consecutive days, or 5 cumulative days in a 20-day period, or places restrictions on you that your employer does not accommodate, you may be entitled to TTD compensation.    Temporary Partial Disability (TPD): If the wage you receive upon reemployment is less than the compensation for TTD to which you are entitled, the insurer may be required to pay you TPD compensation to make up the difference. TPD can only be paid for a maximum of 24 months.    Permanent Partial Disability (PPD): When your medical condition is stable and there is an indication of a PPD as a result of your injury or OD, within 30 days, your insurer must arrange for an evaluation by a rating physician or chiropractor to determine the degree of your PPD. The amount of your PPD award depends on the date of injury, the results of the PPD evaluation and your age and wage.    Permanent Total Disability (PTD): If you are medically certified by a treating physician or chiropractor as permanently and totally disabled and have been granted a PTD status by your insurer, you are entitled to receive monthly benefits not to exceed 66 2/3% of your average monthly wage. The amount of your PTD payments is subject to reduction if you previously received a PPD award.    Vocational Rehabilitation Services: You may be eligible for vocational rehabilitation services if you are unable to return to the job due to a permanent physical impairment or permanent restrictions as a result of your injury or occupational disease.    Transportation and Per Joanne Reimbursement: You may be eligible for travel expenses and per joanne associated with medical treatment.    Reopening: You may be able to reopen your claim if your condition worsens after claim closure.     Appeal Process: If you disagree with a written determination issued by the  insurer or the insurer does not respond to your request, you may appeal to the Department of Administration, , by following the instructions contained in your determination letter. You must appeal the determination within 70 days from the date of the determination letter at [address], [address], [place], [place] [address], or [address], [address], [place], [place] [address]. If you disagree with the  decision, you may appeal to the Department of Administration, . You must file your appeal within 30 days from the date of the  decision letter at [address], [address], [place], [place] [address], or [address], [address], [place], [place] [address]. If you disagree with a decision of an , you may file a petition for judicial review with the District Court. You must do so within 30 days of the Appeal Officer’s decision. You may be represented by an  at your own expense or you may contact the [facility] for possible representation.    [place]  for Injured Workers (NAIW): If you disagree with a  decision, you may request that NAIW represent you without charge at an  Hearing. For information regarding denial of benefits, you may contact the [facility] at: [address], [address], [place] [address], [phone], or [address], [place] [address], [phone]    To File a Complaint with the Division: If you wish to file a complaint with the  of the Division of Industrial Relations (DIR),  please contact the Workers’ Compensation Section, [address] [facility], [facility] [address], [place], [place] [address], telephone [phone], or [address], [place], [place] [address], telephone [phone].    For assistance with Workers’ Compensation Issues: You may contact the Office of the Governor Consumer Health  Assistance, [address], [address], [place], [place] [address], Toll Free 1-372.674.6938, Web site: [email or web address]., E-mail [email or web address].  D-2 (rev. 06/18)              __________________________________________________________________                                    _________________            Employee Name / Signature                                                                                                                            Date

## 2020-11-30 NOTE — ED PROVIDER NOTES
ED Provider Note    CHIEF COMPLAINT  Chief Complaint   Patient presents with   • Chemical Exposure       HPI  Ho Thompson is a 25 y.o. male who presents to the emergency department after hydrocarbon exposure at work.  He is totally asymptomatic at this time.  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    SOCIAL HISTORY  Social History     Tobacco Use   • Smoking status: Never Smoker   • Smokeless tobacco: Former User     Types: Chew   Substance and Sexual Activity   • Alcohol use: No   • Drug use: No   • Sexual activity: Yes       SURGICAL HISTORY  patient denies any surgical history    CURRENT MEDICATIONS  Reviewed.  See Encounter Summary.  I    ALLERGIES  No Known Allergies    PHYSICAL EXAM  VITAL SIGNS: /102   Pulse 67   Temp 36.6 °C (97.9 °F) (Temporal)   Resp 18   Ht 1.829 m (6')   Wt 95.9 kg (211 lb 6.7 oz)   SpO2 97%   BMI 28.67 kg/m²   Constitutional: Pleasant, Alert in no apparent distress.  HENT: Normocephalic, Bilateral external ears normal. Nose normal.   Eyes: Pupils are equal and reactive. Conjunctiva normal, non-icteric.   Thorax & Lungs: Easy unlabored respirations  Abdomen:  No gross signs of peritonitis, no pain with movement   Skin: Visualized skin is  Dry, No erythema, No rash.   Extremities:   No edema, No asymmetry  Neurologic: Alert, Grossly non-focal.   Psychiatric: Affect and Mood normal      COURSE & MEDICAL DECISION MAKING  Nursing notes and vital signs were reviewed. (See chart for details)    The patient presents to the Emergency Department with chemical exposure, he has no evidence or signs of pneumonitis at this time.  I talked him about watching the signs and symptoms if he has any worsening symptoms he will return for further work-up.  At this time I think he is medically cleared to be  discharged home    FINAL IMPRESSION  1. Chemical exposure            Electronically signed by: Monique Bob M.D., 11/30/2020 2:58 PM

## 2020-11-30 NOTE — LETTER
FORM C-4:  EMPLOYEE’S CLAIM FOR COMPENSATION/ REPORT OF INITIAL TREATMENT  EMPLOYEE’S CLAIM - PROVIDE ALL INFORMATION REQUESTED   First Name Ho Last Name Donna Birthdate 1995  Sex male Claim Number   Home Address [address]   [facility]             [place] [address]                                   Age  25 y.o. 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 (if applicable)  Pulling out wire from old panel's near accident   How did this injury or occupational disease occur? Be specific and answer in detail. Use additional sheet if necessary)  We were exposed to a freon type chemical from an improper disposal of an old refrigerator. Myself+my coworkers could smell fuses + could feel burning in the eyes + throat. Vaccated scene immediately but still the burn and coughing   If you believe that you have an occupational disease, when did you first have knowledge of the disability and it relationship to your employment? NA Witnesses to the Accident  Cornelio Carr   Nature of Injury or Occupational Disease  Refridgerator chemical exposure Part(s) of Body Injured or Affected  , ,  Eye's throat  lungs   I CERTIFY THAT THE ABOVE IS TRUE AND CORRECT TO THE BEST OF MY KNOWLEDGE AND THAT I HAVE PROVIDED THIS INFORMATION IN ORDER TO OBTAIN THE BENEFITS OF [place], [place]IVE [place] CHAPTER 617 [place]).  I HEREBY AUTHORIZE ANY PHYSICIAN, CHIROPRACTOR, SURGEON, PRACTITIONER, OR OTHER PERSON, ANY HOSPITAL, INCLUDING [facility] OR [facility], ANY MEDICAL SERVICE ORGANIZATION, ANY INSURANCE COMPANY, OR OTHER INSTITUTION OR ORGANIZATION TO RELEASE TO EACH OTHER, ANY MEDICAL OR OTHER INFORMATION, INCLUDING BENEFITS PAID OR PAYABLE, PERTINENT TO THIS INJURY OR DISEASE, EXCEPT INFORMATION RELATIVE TO DIAGNOSIS, TREATMENT AND/OR COUNSELING FOR AIDS, PSYCHOLOGICAL CONDITIONS, ALCOHOL OR CONTROLLED SUBSTANCES, FOR WHICH I MUST GIVE SPECIFIC AUTHORIZATION.  A PHOTOSTAT OF THIS AUTHORIZATION SHALL BE AS VALID AS THE ORIGINAL.  Date                                      Place                                                                             Employee’s Signature   THIS REPORT MUST BE COMPLETED AND MAILED WITHIN 3 WORKING DAYS OF TREATMENT   Place [facility], EMERGENCY DEPT                       Name of Facility [facility]   Date  11/30/2020 Diagnosis  (Z77.098) Exposure to chemical inhalation Is there evidence the injured employee was under the influence of alcohol and/or another controlled substance at the time of accident?   Hour  3:48 PM Description of Injury or Disease  Exposure to chemical inhalation No   Treatment  Pulse ox, eval  Have you advised the patient to remain off work five days or more?         No   X-Ray Findings    If Yes   From Date    To Date      From information given by the employee, together with medical evidence, can you directly connect this injury or occupational disease as job incurred? Yes If No, is employee capable of: Full Duty  Yes Modified Duty       "  Is additional medical care by a physician indicated? No If Modified Duty, Specify any Limitations / Restrictions       Do you know of any previous injury or disease contributing to this condition or occupational disease? No    Date 11/30/2020 Print Doctor’s Name BobDaphne katz I certify the employer’s copy of this form was mailed on:   Address [address] [address]  [phone] INSURER’S USE ONLY   Provider’s Tax ID Number [id number] Telephone Dept: [phone]    Doctor’s Signature e-DAPHNE Concepcion M.D. Degree  MD      Form C-4 (rev.10/07)                                                                         BRIEF DESCRIPTION OF RIGHTS AND BENEFITS  ([place]ursuant to [place] 616C.050)    Notice of Injury or Occupational Disease (Incident Report Form C-1): If an injury or occupational disease (OD) arises out of and in the course of employment, you must provide written notice to your employer as soon as practicable, but no later than 7 days after the accident or OD. Your employer shall maintain a sufficient supply of the required forms.    Claim for Compensation (Form C-4): If medical treatment is sought, the form C-4 is available at the place of initial treatment. A completed \"Claim for Compensation\" (Form C-4) must be filed within 90 days after an accident or OD. The treating physician or chiropractor must, within 3 working days after treatment, complete and mail to the employer, the employer's insurer and third-party , the Claim for Compensation.    Medical Treatment: If you require medical treatment for your on-the-job injury or OD, you may be required to select a physician or chiropractor from a list provided by your workers’ compensation insurer, if it has contracted with an Organization for Managed Care (MCO) or Preferred Provider Organization (PPO) or providers of health care. If your employer has not entered into a contract with an MCO or PPO, you may select a " physician or chiropractor from the Panel of Physicians and Chiropractors. Any medical costs related to your industrial injury or OD will be paid by your insurer.    Temporary Total Disability (TTD): If your doctor has certified that you are unable to work for a period of at least 5 consecutive days, or 5 cumulative days in a 20-day period, or places restrictions on you that your employer does not accommodate, you may be entitled to TTD compensation.    Temporary Partial Disability (TPD): If the wage you receive upon reemployment is less than the compensation for TTD to which you are entitled, the insurer may be required to pay you TPD compensation to make up the difference. TPD can only be paid for a maximum of 24 months.    Permanent Partial Disability (PPD): When your medical condition is stable and there is an indication of a PPD as a result of your injury or OD, within 30 days, your insurer must arrange for an evaluation by a rating physician or chiropractor to determine the degree of your PPD. The amount of your PPD award depends on the date of injury, the results of the PPD evaluation and your age and wage.    Permanent Total Disability (PTD): If you are medically certified by a treating physician or chiropractor as permanently and totally disabled and have been granted a PTD status by your insurer, you are entitled to receive monthly benefits not to exceed 66 2/3% of your average monthly wage. The amount of your PTD payments is subject to reduction if you previously received a PPD award.    Vocational Rehabilitation Services: You may be eligible for vocational rehabilitation services if you are unable to return to the job due to a permanent physical impairment or permanent restrictions as a result of your injury or occupational disease.    Transportation and Per Joanne Reimbursement: You may be eligible for travel expenses and per joanne associated with medical treatment.    Reopening: You may be able to reopen  your claim if your condition worsens after claim closure.     Appeal Process: If you disagree with a written determination issued by the insurer or the insurer does not respond to your request, you may appeal to the Department of Administration, , by following the instructions contained in your determination letter. You must appeal the determination within 70 days from the date of the determination letter at [address], [address], [place], [place] [address], or [address], [place], [place] [address]. If you disagree with the  decision, you may appeal to the Department of Administration, . You must file your appeal within 30 days from the date of the  decision letter at [address], [address], [place], [place] [address], or [address], [place], [place] [address]. If you disagree with a decision of an , you may file a petition for judicial review with the District Court. You must do so within 30 days of the Appeal Officer’s decision. You may be represented by an  at your own expense or you may contact the [facility] for possible representation.    [place]  for Injured Workers ([place]IW): If you disagree with a  decision, you may request that NAIW represent you without charge at an  Hearing. For information regarding denial of benefits, you may contact the [facility] at: [address], [address], [place] [address], [phone], or [address], [place] [address], [phone]    To File a Complaint with the Division: If you wish to file a complaint with the  of the Division of Industrial Relations (DIR),  please contact the Workers’ Compensation Section, [address] [facility] [address], [place], [place] [address], telephone [phone]) [phone], or [address], [place], [place] [address],  telephone [phone].    For assistance with Workers’ Compensation Issues: You may contact the Office of the Governor Consumer Health Assistance, [address] [place] [address], [address], [place] [address], Toll Free 1-507.103.2821, Web site: http://[email or web address]., E-mail [email or web address]  D-2 (rev. 06/18)              __________________________________________________________________                                    _________________            Employee Name / Signature                                                                                                                            Date

## 2021-01-14 ENCOUNTER — APPOINTMENT (OUTPATIENT)
Dept: RADIOLOGY | Facility: MEDICAL CENTER | Age: 26
DRG: 639 | End: 2021-01-14
Attending: EMERGENCY MEDICINE
Payer: COMMERCIAL

## 2021-01-14 ENCOUNTER — HOSPITAL ENCOUNTER (INPATIENT)
Facility: MEDICAL CENTER | Age: 26
LOS: 3 days | DRG: 639 | End: 2021-01-17
Attending: EMERGENCY MEDICINE | Admitting: HOSPITALIST
Payer: COMMERCIAL

## 2021-01-14 DIAGNOSIS — E10.10 DIABETIC KETOACIDOSIS WITHOUT COMA ASSOCIATED WITH TYPE 1 DIABETES MELLITUS (HCC): ICD-10-CM

## 2021-01-14 LAB
ALBUMIN SERPL BCP-MCNC: 5 G/DL (ref 3.2–4.9)
ALBUMIN/GLOB SERPL: 1.3 G/DL
ALP SERPL-CCNC: 128 U/L (ref 30–99)
ALT SERPL-CCNC: 30 U/L (ref 2–50)
ANION GAP SERPL CALC-SCNC: 30 MMOL/L (ref 7–16)
APPEARANCE UR: CLEAR
AST SERPL-CCNC: 18 U/L (ref 12–45)
BACTERIA #/AREA URNS HPF: ABNORMAL /HPF
BASE EXCESS BLDV CALC-SCNC: -20 MMOL/L
BASOPHILS # BLD AUTO: 0.5 % (ref 0–1.8)
BASOPHILS # BLD: 0.05 K/UL (ref 0–0.12)
BILIRUB SERPL-MCNC: 0.4 MG/DL (ref 0.1–1.5)
BILIRUB UR QL STRIP.AUTO: NEGATIVE
BODY TEMPERATURE: ABNORMAL CENTIGRADE
BUN SERPL-MCNC: 12 MG/DL (ref 8–22)
CALCIUM SERPL-MCNC: 9.9 MG/DL (ref 8.4–10.2)
CHLORIDE SERPL-SCNC: 93 MMOL/L (ref 96–112)
CO2 SERPL-SCNC: 8 MMOL/L (ref 20–33)
COLOR UR: YELLOW
CREAT SERPL-MCNC: 1.13 MG/DL (ref 0.5–1.4)
EOSINOPHIL # BLD AUTO: 0 K/UL (ref 0–0.51)
EOSINOPHIL NFR BLD: 0 % (ref 0–6.9)
ERYTHROCYTE [DISTWIDTH] IN BLOOD BY AUTOMATED COUNT: 40.4 FL (ref 35.9–50)
FLUAV RNA SPEC QL NAA+PROBE: NEGATIVE
FLUBV RNA SPEC QL NAA+PROBE: NEGATIVE
GLOBULIN SER CALC-MCNC: 3.8 G/DL (ref 1.9–3.5)
GLUCOSE SERPL-MCNC: 580 MG/DL (ref 65–99)
GLUCOSE UR STRIP.AUTO-MCNC: 500 MG/DL
HCO3 BLDV-SCNC: 8 MMOL/L (ref 24–28)
HCT VFR BLD AUTO: 49.1 % (ref 42–52)
HGB BLD-MCNC: 17.3 G/DL (ref 14–18)
IMM GRANULOCYTES # BLD AUTO: 0.04 K/UL (ref 0–0.11)
IMM GRANULOCYTES NFR BLD AUTO: 0.4 % (ref 0–0.9)
KETONES UR STRIP.AUTO-MCNC: >=80 MG/DL
LEUKOCYTE ESTERASE UR QL STRIP.AUTO: NEGATIVE
LIPASE SERPL-CCNC: 32 U/L (ref 7–58)
LYMPHOCYTES # BLD AUTO: 1.05 K/UL (ref 1–4.8)
LYMPHOCYTES NFR BLD: 10.3 % (ref 22–41)
MCH RBC QN AUTO: 31.9 PG (ref 27–33)
MCHC RBC AUTO-ENTMCNC: 35.2 G/DL (ref 33.7–35.3)
MCV RBC AUTO: 90.4 FL (ref 81.4–97.8)
MICRO URNS: ABNORMAL
MONOCYTES # BLD AUTO: 0.47 K/UL (ref 0–0.85)
MONOCYTES NFR BLD AUTO: 4.6 % (ref 0–13.4)
MUCOUS THREADS #/AREA URNS HPF: ABNORMAL /HPF
NEUTROPHILS # BLD AUTO: 8.63 K/UL (ref 1.82–7.42)
NEUTROPHILS NFR BLD: 84.2 % (ref 44–72)
NITRITE UR QL STRIP.AUTO: NEGATIVE
NRBC # BLD AUTO: 0 K/UL
NRBC BLD-RTO: 0 /100 WBC
PCO2 BLDV: 27.7 MMHG (ref 41–51)
PH BLDV: 7.09 [PH] (ref 7.31–7.45)
PH UR STRIP.AUTO: 5.5 [PH] (ref 5–8)
PLATELET # BLD AUTO: 282 K/UL (ref 164–446)
PMV BLD AUTO: 9.9 FL (ref 9–12.9)
PO2 BLDV: 38.1 MMHG (ref 25–40)
POTASSIUM SERPL-SCNC: 4.7 MMOL/L (ref 3.6–5.5)
PROT SERPL-MCNC: 8.8 G/DL (ref 6–8.2)
PROT UR QL STRIP: 30 MG/DL
RBC # BLD AUTO: 5.43 M/UL (ref 4.7–6.1)
RBC # URNS HPF: ABNORMAL /HPF
RBC UR QL AUTO: ABNORMAL
RSV RNA SPEC QL NAA+PROBE: NEGATIVE
SAO2 % BLDV: 66.7 %
SARS-COV-2 RNA RESP QL NAA+PROBE: NOTDETECTED
SODIUM SERPL-SCNC: 131 MMOL/L (ref 135–145)
SP GR UR REFRACTOMETRY: 1.04
SPECIMEN SOURCE: NORMAL
WBC # BLD AUTO: 10.2 K/UL (ref 4.8–10.8)
WBC #/AREA URNS HPF: ABNORMAL /HPF

## 2021-01-14 PROCEDURE — 99291 CRITICAL CARE FIRST HOUR: CPT

## 2021-01-14 PROCEDURE — 700105 HCHG RX REV CODE 258: Performed by: EMERGENCY MEDICINE

## 2021-01-14 PROCEDURE — 0241U HCHG SARS-COV-2 COVID-19 NFCT DS RESP RNA 4 TRGT MIC: CPT

## 2021-01-14 PROCEDURE — 82803 BLOOD GASES ANY COMBINATION: CPT

## 2021-01-14 PROCEDURE — 96365 THER/PROPH/DIAG IV INF INIT: CPT

## 2021-01-14 PROCEDURE — 96366 THER/PROPH/DIAG IV INF ADDON: CPT

## 2021-01-14 PROCEDURE — 85025 COMPLETE CBC W/AUTO DIFF WBC: CPT

## 2021-01-14 PROCEDURE — 71045 X-RAY EXAM CHEST 1 VIEW: CPT

## 2021-01-14 PROCEDURE — 700105 HCHG RX REV CODE 258

## 2021-01-14 PROCEDURE — 83690 ASSAY OF LIPASE: CPT

## 2021-01-14 PROCEDURE — 80053 COMPREHEN METABOLIC PANEL: CPT

## 2021-01-14 PROCEDURE — 36415 COLL VENOUS BLD VENIPUNCTURE: CPT

## 2021-01-14 PROCEDURE — 770022 HCHG ROOM/CARE - ICU (200)

## 2021-01-14 PROCEDURE — 82962 GLUCOSE BLOOD TEST: CPT

## 2021-01-14 PROCEDURE — 81001 URINALYSIS AUTO W/SCOPE: CPT

## 2021-01-14 PROCEDURE — A9270 NON-COVERED ITEM OR SERVICE: HCPCS | Performed by: EMERGENCY MEDICINE

## 2021-01-14 PROCEDURE — 700102 HCHG RX REV CODE 250 W/ 637 OVERRIDE(OP): Performed by: EMERGENCY MEDICINE

## 2021-01-14 PROCEDURE — 700102 HCHG RX REV CODE 250 W/ 637 OVERRIDE(OP)

## 2021-01-14 PROCEDURE — 82010 KETONE BODYS QUAN: CPT

## 2021-01-14 RX ORDER — ACETAMINOPHEN 500 MG
1000 TABLET ORAL ONCE
Status: COMPLETED | OUTPATIENT
Start: 2021-01-14 | End: 2021-01-14

## 2021-01-14 RX ORDER — DOCUSATE SODIUM 100 MG/1
100 CAPSULE, LIQUID FILLED ORAL 2 TIMES DAILY
COMMUNITY

## 2021-01-14 RX ORDER — SODIUM CHLORIDE, SODIUM LACTATE, POTASSIUM CHLORIDE, CALCIUM CHLORIDE 600; 310; 30; 20 MG/100ML; MG/100ML; MG/100ML; MG/100ML
1000 INJECTION, SOLUTION INTRAVENOUS ONCE
Status: COMPLETED | OUTPATIENT
Start: 2021-01-14 | End: 2021-01-14

## 2021-01-14 RX ORDER — SODIUM CHLORIDE, SODIUM LACTATE, POTASSIUM CHLORIDE, CALCIUM CHLORIDE 600; 310; 30; 20 MG/100ML; MG/100ML; MG/100ML; MG/100ML
1000 INJECTION, SOLUTION INTRAVENOUS ONCE
Status: COMPLETED | OUTPATIENT
Start: 2021-01-14 | End: 2021-01-15

## 2021-01-14 RX ADMIN — SODIUM CHLORIDE 4 UNITS/HR: 9 INJECTION, SOLUTION INTRAVENOUS at 22:52

## 2021-01-14 RX ADMIN — SODIUM CHLORIDE, POTASSIUM CHLORIDE, SODIUM LACTATE AND CALCIUM CHLORIDE 1000 ML: 600; 310; 30; 20 INJECTION, SOLUTION INTRAVENOUS at 22:06

## 2021-01-14 RX ADMIN — SODIUM CHLORIDE, POTASSIUM CHLORIDE, SODIUM LACTATE AND CALCIUM CHLORIDE 1000 ML: 600; 310; 30; 20 INJECTION, SOLUTION INTRAVENOUS at 22:57

## 2021-01-14 RX ADMIN — ACETAMINOPHEN 1000 MG: 500 TABLET, FILM COATED ORAL at 23:00

## 2021-01-14 ASSESSMENT — ENCOUNTER SYMPTOMS
COUGH: 0
ABDOMINAL PAIN: 0
POLYDIPSIA: 1
SHORTNESS OF BREATH: 0
CHILLS: 0
EYE REDNESS: 0
SORE THROAT: 0
FOCAL WEAKNESS: 0
HEADACHES: 1
VOMITING: 1
CONSTIPATION: 1
BACK PAIN: 0
NAUSEA: 1
SEIZURES: 0
FEVER: 0
NECK PAIN: 0
BLURRED VISION: 0
WEIGHT LOSS: 1

## 2021-01-15 PROBLEM — E11.10 DKA (DIABETIC KETOACIDOSES): Status: ACTIVE | Noted: 2021-01-15

## 2021-01-15 LAB
ALBUMIN SERPL BCP-MCNC: 4 G/DL (ref 3.2–4.9)
ALBUMIN/GLOB SERPL: 1.4 G/DL
ALP SERPL-CCNC: 97 U/L (ref 30–99)
ALT SERPL-CCNC: 23 U/L (ref 2–50)
ANION GAP SERPL CALC-SCNC: 11 MMOL/L (ref 7–16)
ANION GAP SERPL CALC-SCNC: 12 MMOL/L (ref 7–16)
ANION GAP SERPL CALC-SCNC: 13 MMOL/L (ref 7–16)
ANION GAP SERPL CALC-SCNC: 24 MMOL/L (ref 7–16)
ANION GAP SERPL CALC-SCNC: 9 MMOL/L (ref 7–16)
AST SERPL-CCNC: 11 U/L (ref 12–45)
B-OH-BUTYR SERPL-MCNC: >8 MMOL/L (ref 0.02–0.27)
BASOPHILS # BLD AUTO: 0.4 % (ref 0–1.8)
BASOPHILS # BLD: 0.04 K/UL (ref 0–0.12)
BILIRUB SERPL-MCNC: 0.3 MG/DL (ref 0.1–1.5)
BUN SERPL-MCNC: 10 MG/DL (ref 8–22)
BUN SERPL-MCNC: 10 MG/DL (ref 8–22)
BUN SERPL-MCNC: 11 MG/DL (ref 8–22)
CALCIUM SERPL-MCNC: 8.6 MG/DL (ref 8.4–10.2)
CALCIUM SERPL-MCNC: 8.7 MG/DL (ref 8.4–10.2)
CALCIUM SERPL-MCNC: 8.7 MG/DL (ref 8.4–10.2)
CALCIUM SERPL-MCNC: 8.9 MG/DL (ref 8.4–10.2)
CALCIUM SERPL-MCNC: 9 MG/DL (ref 8.4–10.2)
CHLORIDE SERPL-SCNC: 100 MMOL/L (ref 96–112)
CHLORIDE SERPL-SCNC: 106 MMOL/L (ref 96–112)
CHLORIDE SERPL-SCNC: 107 MMOL/L (ref 96–112)
CHLORIDE SERPL-SCNC: 110 MMOL/L (ref 96–112)
CHLORIDE SERPL-SCNC: 110 MMOL/L (ref 96–112)
CO2 SERPL-SCNC: 16 MMOL/L (ref 20–33)
CO2 SERPL-SCNC: 8 MMOL/L (ref 20–33)
CREAT SERPL-MCNC: 0.66 MG/DL (ref 0.5–1.4)
CREAT SERPL-MCNC: 0.68 MG/DL (ref 0.5–1.4)
CREAT SERPL-MCNC: 0.75 MG/DL (ref 0.5–1.4)
CREAT SERPL-MCNC: 0.75 MG/DL (ref 0.5–1.4)
CREAT SERPL-MCNC: 0.9 MG/DL (ref 0.5–1.4)
EOSINOPHIL # BLD AUTO: 0.01 K/UL (ref 0–0.51)
EOSINOPHIL NFR BLD: 0.1 % (ref 0–6.9)
ERYTHROCYTE [DISTWIDTH] IN BLOOD BY AUTOMATED COUNT: 40 FL (ref 35.9–50)
GLOBULIN SER CALC-MCNC: 2.8 G/DL (ref 1.9–3.5)
GLUCOSE BLD-MCNC: 103 MG/DL (ref 65–99)
GLUCOSE BLD-MCNC: 110 MG/DL (ref 65–99)
GLUCOSE BLD-MCNC: 114 MG/DL (ref 65–99)
GLUCOSE BLD-MCNC: 121 MG/DL (ref 65–99)
GLUCOSE BLD-MCNC: 124 MG/DL (ref 65–99)
GLUCOSE BLD-MCNC: 132 MG/DL (ref 65–99)
GLUCOSE BLD-MCNC: 133 MG/DL (ref 65–99)
GLUCOSE BLD-MCNC: 146 MG/DL (ref 65–99)
GLUCOSE BLD-MCNC: 146 MG/DL (ref 65–99)
GLUCOSE BLD-MCNC: 149 MG/DL (ref 65–99)
GLUCOSE BLD-MCNC: 150 MG/DL (ref 65–99)
GLUCOSE BLD-MCNC: 169 MG/DL (ref 65–99)
GLUCOSE BLD-MCNC: 183 MG/DL (ref 65–99)
GLUCOSE BLD-MCNC: 190 MG/DL (ref 65–99)
GLUCOSE BLD-MCNC: 192 MG/DL (ref 65–99)
GLUCOSE BLD-MCNC: 193 MG/DL (ref 65–99)
GLUCOSE BLD-MCNC: 216 MG/DL (ref 65–99)
GLUCOSE BLD-MCNC: 238 MG/DL (ref 65–99)
GLUCOSE BLD-MCNC: 249 MG/DL (ref 65–99)
GLUCOSE BLD-MCNC: 250 MG/DL (ref 65–99)
GLUCOSE BLD-MCNC: 266 MG/DL (ref 65–99)
GLUCOSE BLD-MCNC: 315 MG/DL (ref 65–99)
GLUCOSE BLD-MCNC: 382 MG/DL (ref 65–99)
GLUCOSE BLD-MCNC: 96 MG/DL (ref 65–99)
GLUCOSE SERPL-MCNC: 118 MG/DL (ref 65–99)
GLUCOSE SERPL-MCNC: 149 MG/DL (ref 65–99)
GLUCOSE SERPL-MCNC: 207 MG/DL (ref 65–99)
GLUCOSE SERPL-MCNC: 216 MG/DL (ref 65–99)
GLUCOSE SERPL-MCNC: 336 MG/DL (ref 65–99)
HCT VFR BLD AUTO: 40.3 % (ref 42–52)
HGB BLD-MCNC: 14.7 G/DL (ref 14–18)
IMM GRANULOCYTES # BLD AUTO: 0.08 K/UL (ref 0–0.11)
IMM GRANULOCYTES NFR BLD AUTO: 0.8 % (ref 0–0.9)
LYMPHOCYTES # BLD AUTO: 1.95 K/UL (ref 1–4.8)
LYMPHOCYTES NFR BLD: 18.7 % (ref 22–41)
MAGNESIUM SERPL-MCNC: 1.9 MG/DL (ref 1.5–2.5)
MAGNESIUM SERPL-MCNC: 1.9 MG/DL (ref 1.5–2.5)
MAGNESIUM SERPL-MCNC: 2.4 MG/DL (ref 1.5–2.5)
MCH RBC QN AUTO: 32.5 PG (ref 27–33)
MCHC RBC AUTO-ENTMCNC: 36.5 G/DL (ref 33.7–35.3)
MCV RBC AUTO: 89 FL (ref 81.4–97.8)
MONOCYTES # BLD AUTO: 0.75 K/UL (ref 0–0.85)
MONOCYTES NFR BLD AUTO: 7.2 % (ref 0–13.4)
NEUTROPHILS # BLD AUTO: 7.58 K/UL (ref 1.82–7.42)
NEUTROPHILS NFR BLD: 72.8 % (ref 44–72)
NRBC # BLD AUTO: 0 K/UL
NRBC BLD-RTO: 0 /100 WBC
PHOSPHATE SERPL-MCNC: 1.6 MG/DL (ref 2.5–4.5)
PHOSPHATE SERPL-MCNC: 2 MG/DL (ref 2.5–4.5)
PHOSPHATE SERPL-MCNC: 2.2 MG/DL (ref 2.5–4.5)
PLATELET # BLD AUTO: 245 K/UL (ref 164–446)
PMV BLD AUTO: 9.8 FL (ref 9–12.9)
POTASSIUM SERPL-SCNC: 3.2 MMOL/L (ref 3.6–5.5)
POTASSIUM SERPL-SCNC: 3.7 MMOL/L (ref 3.6–5.5)
POTASSIUM SERPL-SCNC: 3.9 MMOL/L (ref 3.6–5.5)
PROT SERPL-MCNC: 6.8 G/DL (ref 6–8.2)
RBC # BLD AUTO: 4.53 M/UL (ref 4.7–6.1)
SODIUM SERPL-SCNC: 132 MMOL/L (ref 135–145)
SODIUM SERPL-SCNC: 135 MMOL/L (ref 135–145)
SODIUM SERPL-SCNC: 137 MMOL/L (ref 135–145)
WBC # BLD AUTO: 10.4 K/UL (ref 4.8–10.8)

## 2021-01-15 PROCEDURE — 82962 GLUCOSE BLOOD TEST: CPT | Mod: 91

## 2021-01-15 PROCEDURE — 83735 ASSAY OF MAGNESIUM: CPT

## 2021-01-15 PROCEDURE — 700111 HCHG RX REV CODE 636 W/ 250 OVERRIDE (IP): Performed by: INTERNAL MEDICINE

## 2021-01-15 PROCEDURE — 83036 HEMOGLOBIN GLYCOSYLATED A1C: CPT

## 2021-01-15 PROCEDURE — 700111 HCHG RX REV CODE 636 W/ 250 OVERRIDE (IP): Performed by: HOSPITALIST

## 2021-01-15 PROCEDURE — 99291 CRITICAL CARE FIRST HOUR: CPT | Performed by: INTERNAL MEDICINE

## 2021-01-15 PROCEDURE — 99223 1ST HOSP IP/OBS HIGH 75: CPT | Performed by: HOSPITALIST

## 2021-01-15 PROCEDURE — 80053 COMPREHEN METABOLIC PANEL: CPT

## 2021-01-15 PROCEDURE — 93005 ELECTROCARDIOGRAM TRACING: CPT | Performed by: INTERNAL MEDICINE

## 2021-01-15 PROCEDURE — 770022 HCHG ROOM/CARE - ICU (200)

## 2021-01-15 PROCEDURE — 700101 HCHG RX REV CODE 250: Performed by: HOSPITALIST

## 2021-01-15 PROCEDURE — 84100 ASSAY OF PHOSPHORUS: CPT

## 2021-01-15 PROCEDURE — 80048 BASIC METABOLIC PNL TOTAL CA: CPT | Mod: 91

## 2021-01-15 PROCEDURE — 700105 HCHG RX REV CODE 258: Performed by: HOSPITALIST

## 2021-01-15 PROCEDURE — A9270 NON-COVERED ITEM OR SERVICE: HCPCS | Performed by: HOSPITALIST

## 2021-01-15 PROCEDURE — 85025 COMPLETE CBC W/AUTO DIFF WBC: CPT

## 2021-01-15 PROCEDURE — 94760 N-INVAS EAR/PLS OXIMETRY 1: CPT

## 2021-01-15 PROCEDURE — 700102 HCHG RX REV CODE 250 W/ 637 OVERRIDE(OP): Performed by: HOSPITALIST

## 2021-01-15 RX ORDER — BISACODYL 10 MG
10 SUPPOSITORY, RECTAL RECTAL
Status: DISCONTINUED | OUTPATIENT
Start: 2021-01-15 | End: 2021-01-17 | Stop reason: HOSPADM

## 2021-01-15 RX ORDER — SODIUM CHLORIDE 9 MG/ML
2000 INJECTION, SOLUTION INTRAVENOUS ONCE
Status: DISCONTINUED | OUTPATIENT
Start: 2021-01-15 | End: 2021-01-15

## 2021-01-15 RX ORDER — POLYETHYLENE GLYCOL 3350 17 G/17G
1 POWDER, FOR SOLUTION ORAL
Status: DISCONTINUED | OUTPATIENT
Start: 2021-01-15 | End: 2021-01-17 | Stop reason: HOSPADM

## 2021-01-15 RX ORDER — DEXTROSE AND SODIUM CHLORIDE 10; .45 G/100ML; G/100ML
INJECTION, SOLUTION INTRAVENOUS CONTINUOUS
Status: DISCONTINUED | OUTPATIENT
Start: 2021-01-15 | End: 2021-01-16

## 2021-01-15 RX ORDER — AMOXICILLIN 250 MG
2 CAPSULE ORAL 2 TIMES DAILY
Status: DISCONTINUED | OUTPATIENT
Start: 2021-01-15 | End: 2021-01-17 | Stop reason: HOSPADM

## 2021-01-15 RX ORDER — MORPHINE SULFATE 4 MG/ML
2 INJECTION, SOLUTION INTRAMUSCULAR; INTRAVENOUS
Status: DISCONTINUED | OUTPATIENT
Start: 2021-01-15 | End: 2021-01-17 | Stop reason: HOSPADM

## 2021-01-15 RX ORDER — POTASSIUM CHLORIDE 7.45 MG/ML
10 INJECTION INTRAVENOUS
Status: COMPLETED | OUTPATIENT
Start: 2021-01-15 | End: 2021-01-15

## 2021-01-15 RX ORDER — ONDANSETRON 2 MG/ML
4 INJECTION INTRAMUSCULAR; INTRAVENOUS EVERY 4 HOURS PRN
Status: DISCONTINUED | OUTPATIENT
Start: 2021-01-15 | End: 2021-01-17 | Stop reason: HOSPADM

## 2021-01-15 RX ORDER — PROCHLORPERAZINE EDISYLATE 5 MG/ML
5-10 INJECTION INTRAMUSCULAR; INTRAVENOUS EVERY 4 HOURS PRN
Status: DISCONTINUED | OUTPATIENT
Start: 2021-01-15 | End: 2021-01-17 | Stop reason: HOSPADM

## 2021-01-15 RX ORDER — MAGNESIUM SULFATE HEPTAHYDRATE 40 MG/ML
4 INJECTION, SOLUTION INTRAVENOUS
Status: COMPLETED | OUTPATIENT
Start: 2021-01-15 | End: 2021-01-15

## 2021-01-15 RX ORDER — PROMETHAZINE HYDROCHLORIDE 25 MG/1
12.5-25 SUPPOSITORY RECTAL EVERY 4 HOURS PRN
Status: DISCONTINUED | OUTPATIENT
Start: 2021-01-15 | End: 2021-01-17 | Stop reason: HOSPADM

## 2021-01-15 RX ORDER — DEXTROSE AND SODIUM CHLORIDE 5; .45 G/100ML; G/100ML
INJECTION, SOLUTION INTRAVENOUS CONTINUOUS
Status: DISCONTINUED | OUTPATIENT
Start: 2021-01-15 | End: 2021-01-15

## 2021-01-15 RX ORDER — SODIUM CHLORIDE 9 MG/ML
INJECTION, SOLUTION INTRAVENOUS CONTINUOUS
Status: DISCONTINUED | OUTPATIENT
Start: 2021-01-15 | End: 2021-01-15

## 2021-01-15 RX ORDER — ONDANSETRON 4 MG/1
4 TABLET, ORALLY DISINTEGRATING ORAL EVERY 4 HOURS PRN
Status: DISCONTINUED | OUTPATIENT
Start: 2021-01-15 | End: 2021-01-17 | Stop reason: HOSPADM

## 2021-01-15 RX ORDER — MAGNESIUM SULFATE HEPTAHYDRATE 40 MG/ML
2 INJECTION, SOLUTION INTRAVENOUS
Status: COMPLETED | OUTPATIENT
Start: 2021-01-15 | End: 2021-01-15

## 2021-01-15 RX ORDER — POTASSIUM CHLORIDE 7.45 MG/ML
10 INJECTION INTRAVENOUS
Status: COMPLETED | OUTPATIENT
Start: 2021-01-15 | End: 2021-01-16

## 2021-01-15 RX ORDER — SODIUM CHLORIDE, SODIUM LACTATE, POTASSIUM CHLORIDE, AND CALCIUM CHLORIDE .6; .31; .03; .02 G/100ML; G/100ML; G/100ML; G/100ML
2000 INJECTION, SOLUTION INTRAVENOUS ONCE
Status: ACTIVE | OUTPATIENT
Start: 2021-01-15 | End: 2021-01-16

## 2021-01-15 RX ORDER — ACETAMINOPHEN 325 MG/1
650 TABLET ORAL EVERY 6 HOURS PRN
Status: DISCONTINUED | OUTPATIENT
Start: 2021-01-15 | End: 2021-01-17 | Stop reason: HOSPADM

## 2021-01-15 RX ORDER — PROMETHAZINE HYDROCHLORIDE 25 MG/1
12.5-25 TABLET ORAL EVERY 4 HOURS PRN
Status: DISCONTINUED | OUTPATIENT
Start: 2021-01-15 | End: 2021-01-17 | Stop reason: HOSPADM

## 2021-01-15 RX ADMIN — DEXTROSE AND SODIUM CHLORIDE: 5; 450 INJECTION, SOLUTION INTRAVENOUS at 02:53

## 2021-01-15 RX ADMIN — POTASSIUM CHLORIDE 10 MEQ: 7.46 INJECTION, SOLUTION INTRAVENOUS at 23:02

## 2021-01-15 RX ADMIN — POTASSIUM CHLORIDE 10 MEQ: 7.46 INJECTION, SOLUTION INTRAVENOUS at 17:48

## 2021-01-15 RX ADMIN — POTASSIUM PHOSPHATE, MONOBASIC AND POTASSIUM PHOSPHATE, DIBASIC 30 MMOL: 224; 236 INJECTION, SOLUTION, CONCENTRATE INTRAVENOUS at 10:19

## 2021-01-15 RX ADMIN — POTASSIUM CHLORIDE 10 MEQ: 7.46 INJECTION, SOLUTION INTRAVENOUS at 13:47

## 2021-01-15 RX ADMIN — SODIUM CHLORIDE: 9 INJECTION, SOLUTION INTRAVENOUS at 00:55

## 2021-01-15 RX ADMIN — POTASSIUM CHLORIDE 10 MEQ: 7.46 INJECTION, SOLUTION INTRAVENOUS at 05:46

## 2021-01-15 RX ADMIN — POTASSIUM CHLORIDE 10 MEQ: 7.46 INJECTION, SOLUTION INTRAVENOUS at 09:16

## 2021-01-15 RX ADMIN — SODIUM CHLORIDE 4 UNITS/HR: 9 INJECTION, SOLUTION INTRAVENOUS at 14:40

## 2021-01-15 RX ADMIN — POTASSIUM CHLORIDE 10 MEQ: 7.46 INJECTION, SOLUTION INTRAVENOUS at 12:45

## 2021-01-15 RX ADMIN — DEXTROSE AND SODIUM CHLORIDE: 10; .45 INJECTION, SOLUTION INTRAVENOUS at 11:06

## 2021-01-15 RX ADMIN — POTASSIUM CHLORIDE 10 MEQ: 7.46 INJECTION, SOLUTION INTRAVENOUS at 03:38

## 2021-01-15 RX ADMIN — POTASSIUM CHLORIDE 10 MEQ: 7.46 INJECTION, SOLUTION INTRAVENOUS at 02:11

## 2021-01-15 RX ADMIN — SENNOSIDES AND DOCUSATE SODIUM 2 TABLET: 8.6; 5 TABLET ORAL at 17:51

## 2021-01-15 RX ADMIN — POTASSIUM CHLORIDE 10 MEQ: 7.46 INJECTION, SOLUTION INTRAVENOUS at 21:10

## 2021-01-15 RX ADMIN — POTASSIUM CHLORIDE 10 MEQ: 7.46 INJECTION, SOLUTION INTRAVENOUS at 22:01

## 2021-01-15 RX ADMIN — POTASSIUM CHLORIDE 10 MEQ: 7.46 INJECTION, SOLUTION INTRAVENOUS at 14:39

## 2021-01-15 RX ADMIN — MAGNESIUM SULFATE 2 G: 2 INJECTION INTRAVENOUS at 02:11

## 2021-01-15 RX ADMIN — POTASSIUM CHLORIDE 10 MEQ: 7.46 INJECTION, SOLUTION INTRAVENOUS at 11:31

## 2021-01-15 RX ADMIN — POTASSIUM CHLORIDE 10 MEQ: 7.46 INJECTION, SOLUTION INTRAVENOUS at 04:48

## 2021-01-15 RX ADMIN — POTASSIUM CHLORIDE 10 MEQ: 7.46 INJECTION, SOLUTION INTRAVENOUS at 10:19

## 2021-01-15 RX ADMIN — POTASSIUM CHLORIDE 10 MEQ: 7.46 INJECTION, SOLUTION INTRAVENOUS at 18:55

## 2021-01-15 ASSESSMENT — ENCOUNTER SYMPTOMS
FOCAL WEAKNESS: 0
INSOMNIA: 0
VOMITING: 1
PALPITATIONS: 0
CHILLS: 0
MYALGIAS: 0
BACK PAIN: 1
NAUSEA: 1
NAUSEA: 0
SPUTUM PRODUCTION: 0
ABDOMINAL PAIN: 1
DIZZINESS: 0
VOMITING: 0
COUGH: 0
SINUS PAIN: 0
PSYCHIATRIC NEGATIVE: 1
SENSORY CHANGE: 0
STRIDOR: 0
SORE THROAT: 0
EYE PAIN: 0
DOUBLE VISION: 0
BLURRED VISION: 0
ORTHOPNEA: 0
DIARRHEA: 0
DEPRESSION: 0
NECK PAIN: 0
ABDOMINAL PAIN: 0
WEAKNESS: 0
EYE DISCHARGE: 0
LOSS OF CONSCIOUSNESS: 0
SHORTNESS OF BREATH: 0
FEVER: 0
WEIGHT LOSS: 0
WHEEZING: 0
HEADACHES: 0
BRUISES/BLEEDS EASILY: 0

## 2021-01-15 ASSESSMENT — FIBROSIS 4 INDEX: FIB4 SCORE: 0.31

## 2021-01-15 NOTE — CONSULTS
Critical Care Consultation    Date of consult: 1/15/2021    Referring Physician  Ofe Grigsby M.D.    Reason for Consultation  DKA    History of Presenting Illness  25 y.o. male who presented 1/14/2021 with generalized weakness, nonbloody emesis.  He chews tobacco and follow-up the symptoms were attributable to nicotine withdrawal.  In the ER he was afebrile, tachycardic with a heart rate.  Labs are notable for an elevated glucose of 580, anion gap 30, bicarb 8, VBG pH 7.09.  He has a family history of type 1 diabetes in his brother but no personal history.    Code Status  Full Code    Review of Systems  Review of Systems   Constitutional: Negative for chills, fever and weight loss.   HENT: Negative for congestion, sinus pain and sore throat.    Eyes: Negative for pain and discharge.   Respiratory: Negative for cough, sputum production, shortness of breath, wheezing and stridor.    Cardiovascular: Negative for chest pain, orthopnea and leg swelling.   Gastrointestinal: Negative for abdominal pain, diarrhea, nausea and vomiting.   Genitourinary: Negative for dysuria, frequency and urgency.   Musculoskeletal: Negative for myalgias.   Skin: Negative for rash.   Neurological: Negative for dizziness, sensory change, focal weakness, loss of consciousness and headaches.   Psychiatric/Behavioral: Negative.    All other systems reviewed and are negative.    Past Medical History   has a past medical history of Vaso-vagal reaction (9/30/2014).    Surgical History  Tonsillectomy    Family History  Brother with type 1 diabetes    Social History   reports that he has never smoked. He has quit using smokeless tobacco.  His smokeless tobacco use included chew. He reports that he does not drink alcohol or use drugs.    Medications  Home Medications     Reviewed by Nakul Gutierrez R.N. (Registered Nurse) on 01/15/21 at 0056  Med List Status: Complete   Medication Last Dose Status   cetirizine (ZYRTEC) 10 MG TABS  1/14/2021 Active   docusate sodium (COLACE) 100 MG Cap 1/14/2021 Active   IBUPROFEN PO 1/11/2021 Active   Probiotic Product (PROBIOTIC-10 PO) 1/14/2021 Active              Current Facility-Administered Medications   Medication Dose Route Frequency Provider Last Rate Last Admin   • lactated ringers infusion (BOLUS)  2,000 mL Intravenous Once Ofe Grigsby M.D.   Stopped at 01/15/21 0030   • D10%-0.45% NaCl infusion   Intravenous Continuous Ofe Grigsby M.D. 50 mL/hr at 01/15/21 1106 New Bag at 01/15/21 1106   • MD ALERT-PHARMACY TO CONSULT FOR DKA MONITORING 1 Each  1 Each Other PRN Ofe Grigsby M.D.       • potassium phosphate 30 mmol in  mL ivpb  30 mmol Intravenous Once PRN Ofe Grigsby M.D. 125 mL/hr at 01/15/21 1019 30 mmol at 01/15/21 1019   • Adult DKA potassium(K+) replacement scale  1 Each Intravenous Q4HRS Ofe Grigsby M.D.   1 Each at 01/15/21 1000   • acetaminophen (Tylenol) tablet 650 mg  650 mg Oral Q6HRS PRN Ofe Grigsby M.D.       • ondansetron (ZOFRAN) syringe/vial injection 4 mg  4 mg Intravenous Q4HRS PRN Ofe Grigsby M.D.       • ondansetron (ZOFRAN ODT) dispertab 4 mg  4 mg Oral Q4HRS PRN Ofe Grigsby M.D.       • promethazine (PHENERGAN) tablet 12.5-25 mg  12.5-25 mg Oral Q4HRS PRN Ofe Grigsby M.D.       • promethazine (PHENERGAN) suppository 12.5-25 mg  12.5-25 mg Rectal Q4HRS PRN Ofe Grigsby M.D.       • prochlorperazine (COMPAZINE) injection 5-10 mg  5-10 mg Intravenous Q4HRS PRN Ofe Irizarrya, M.D.       • senna-docusate (PERICOLACE or SENOKOT S) 8.6-50 MG per tablet 2 Tab  2 Tab Oral BID Ofe Grigsby M.D.        And   • polyethylene glycol/lytes (MIRALAX) PACKET 1 Packet  1 Packet Oral QDAY PRSANDRA Grigsby M.D.        And   • magnesium hydroxide (MILK OF MAGNESIA) suspension 30 mL  30 mL Oral QDAY PRSANDRA Grigsby M.D.        And   •  bisacodyl (DULCOLAX) suppository 10 mg  10 mg Rectal QDAY PRN Ofe Grigsby M.D.       • morphine (pf) 4 mg/mL injection 2 mg  2 mg Intravenous Q3HRS PRN Ofe Grigsby M.D.       • insulin regular human (HUMULIN/NOVOLIN R) 62.5 Units in  mL Infusion for DKA  4 Units/hr Intravenous Continuous Ofe Grigsby M.D. 16 mL/hr at 01/15/21 0823 4 Units/hr at 01/15/21 0823   • potassium chloride (KCL) ivpb 10 mEq  10 mEq Intravenous Q HOUR Jeovany Sage M.D. 100 mL/hr at 01/15/21 1347 10 mEq at 01/15/21 1347       Allergies  No Known Allergies    Vital Signs last 24 hours  Temp:  [36.7 °C (98.1 °F)-36.9 °C (98.5 °F)] 36.9 °C (98.5 °F)  Pulse:  [61-95] 69  Resp:  [5-16] 16  BP: (107-143)/(65-93) 115/75  SpO2:  [94 %-99 %] 96 %    Physical Exam  Physical Exam  Constitutional:       General: He is not in acute distress.     Appearance: He is well-developed. He is not diaphoretic.   HENT:      Nose: Nose normal.      Mouth/Throat:      Pharynx: No oropharyngeal exudate.   Eyes:      General: No scleral icterus.        Right eye: No discharge.         Left eye: No discharge.      Conjunctiva/sclera: Conjunctivae normal.      Pupils: Pupils are equal, round, and reactive to light.   Neck:      Musculoskeletal: Neck supple.      Thyroid: No thyromegaly.      Vascular: No JVD.      Trachea: No tracheal deviation.   Cardiovascular:      Rate and Rhythm: Normal rate and regular rhythm.      Heart sounds: Normal heart sounds. No murmur.   Pulmonary:      Effort: Pulmonary effort is normal. No respiratory distress.      Breath sounds: Normal breath sounds. No stridor. No wheezing or rales.   Abdominal:      General: There is no distension.      Palpations: Abdomen is soft.      Tenderness: There is no abdominal tenderness. There is no guarding.   Musculoskeletal: Normal range of motion.         General: No tenderness.   Lymphadenopathy:      Cervical: No cervical adenopathy.   Skin:     General:  Skin is warm and dry.      Capillary Refill: Capillary refill takes less than 2 seconds.      Coloration: Skin is not pale.      Findings: No erythema.   Neurological:      Mental Status: He is alert and oriented to person, place, and time.      Sensory: No sensory deficit.      Motor: No abnormal muscle tone.      Coordination: Coordination normal.      Deep Tendon Reflexes: Reflexes normal.   Psychiatric:         Behavior: Behavior normal.         Thought Content: Thought content normal.         Judgment: Judgment normal.       Fluids    Intake/Output Summary (Last 24 hours) at 1/15/2021 1349  Last data filed at 1/15/2021 1200  Gross per 24 hour   Intake 3707.37 ml   Output 1100 ml   Net 2607.37 ml       Laboratory  Recent Results (from the past 48 hour(s))   COMP METABOLIC PANEL    Collection Time: 01/14/21  9:01 PM   Result Value Ref Range    Sodium 131 (L) 135 - 145 mmol/L    Potassium 4.7 3.6 - 5.5 mmol/L    Chloride 93 (L) 96 - 112 mmol/L    Co2 8 (LL) 20 - 33 mmol/L    Anion Gap 30.0 (H) 7.0 - 16.0    Glucose 580 (HH) 65 - 99 mg/dL    Bun 12 8 - 22 mg/dL    Creatinine 1.13 0.50 - 1.40 mg/dL    Calcium 9.9 8.4 - 10.2 mg/dL    AST(SGOT) 18 12 - 45 U/L    ALT(SGPT) 30 2 - 50 U/L    Alkaline Phosphatase 128 (H) 30 - 99 U/L    Total Bilirubin 0.4 0.1 - 1.5 mg/dL    Albumin 5.0 (H) 3.2 - 4.9 g/dL    Total Protein 8.8 (H) 6.0 - 8.2 g/dL    Globulin 3.8 (H) 1.9 - 3.5 g/dL    A-G Ratio 1.3 g/dL   LIPASE    Collection Time: 01/14/21  9:01 PM   Result Value Ref Range    Lipase 32 7 - 58 U/L   ESTIMATED GFR    Collection Time: 01/14/21  9:01 PM   Result Value Ref Range    GFR If African American >60 >60 mL/min/1.73 m 2    GFR If Non African American >60 >60 mL/min/1.73 m 2   URINALYSIS,CULTURE IF INDICATED    Collection Time: 01/14/21  9:06 PM    Specimen: Urine, Clean Catch   Result Value Ref Range    Color Yellow     Character Clear     Ph 5.5 5.0 - 8.0    Glucose 500 (A) Negative mg/dL    Ketones >=80 (A) Negative  mg/dL    Protein 30 (A) Negative mg/dL    Bilirubin Negative Negative    Nitrite Negative Negative    Leukocyte Esterase Negative Negative    Occult Blood Small (A) Negative    Micro Urine Req Microscopic    REFRACTOMETER SG    Collection Time: 01/14/21  9:06 PM   Result Value Ref Range    Specific Gravity 1.037    URINE MICROSCOPIC (W/UA)    Collection Time: 01/14/21  9:06 PM   Result Value Ref Range    WBC 0-2 (A) /hpf    RBC 0-2 (A) /hpf    Bacteria Rare (A) None /hpf    Mucous Threads Few /hpf   CBC WITH DIFFERENTIAL    Collection Time: 01/14/21 10:08 PM   Result Value Ref Range    WBC 10.2 4.8 - 10.8 K/uL    RBC 5.43 4.70 - 6.10 M/uL    Hemoglobin 17.3 14.0 - 18.0 g/dL    Hematocrit 49.1 42.0 - 52.0 %    MCV 90.4 81.4 - 97.8 fL    MCH 31.9 27.0 - 33.0 pg    MCHC 35.2 33.7 - 35.3 g/dL    RDW 40.4 35.9 - 50.0 fL    Platelet Count 282 164 - 446 K/uL    MPV 9.9 9.0 - 12.9 fL    Neutrophils-Polys 84.20 (H) 44.00 - 72.00 %    Lymphocytes 10.30 (L) 22.00 - 41.00 %    Monocytes 4.60 0.00 - 13.40 %    Eosinophils 0.00 0.00 - 6.90 %    Basophils 0.50 0.00 - 1.80 %    Immature Granulocytes 0.40 0.00 - 0.90 %    Nucleated RBC 0.00 /100 WBC    Neutrophils (Absolute) 8.63 (H) 1.82 - 7.42 K/uL    Lymphs (Absolute) 1.05 1.00 - 4.80 K/uL    Monos (Absolute) 0.47 0.00 - 0.85 K/uL    Eos (Absolute) 0.00 0.00 - 0.51 K/uL    Baso (Absolute) 0.05 0.00 - 0.12 K/uL    Immature Granulocytes (abs) 0.04 0.00 - 0.11 K/uL    NRBC (Absolute) 0.00 K/uL   VENOUS BLOOD GAS    Collection Time: 01/14/21 10:08 PM   Result Value Ref Range    Venous Bg Ph 7.09 (LL) 7.31 - 7.45    Venous Bg Pco2 27.7 (L) 41.0 - 51.0 mmHg    Venous Bg Po2 38.1 25.0 - 40.0 mmHg    Venous Bg O2 Saturation 66.7 %    Venous Bg Hco3 8 (L) 24 - 28 mmol/L    Venous Bg Base Excess -20 mmol/L    Body Temp see below Centigrade   BETA-HYDROXYBUTYRIC ACID    Collection Time: 01/14/21 10:08 PM   Result Value Ref Range    beta-Hydroxybutyric Acid >8.00 (H) 0.02 - 0.27 mmol/L    COV-2, FLU A/B, AND RSV BY PCR (2-4 HOURS CEPHEID): Collect NP swab in VTM    Collection Time: 01/14/21 10:40 PM    Specimen: Respirate   Result Value Ref Range    Influenza virus A RNA Negative Negative    Influenza virus B, PCR Negative Negative    RSV, PCR Negative Negative    SARS-CoV-2 by PCR NotDetected     SARS-CoV-2 Source NP Swab    ACCU-CHEK GLUCOSE    Collection Time: 01/14/21 11:48 PM   Result Value Ref Range    Glucose - Accu-Ck 382 (H) 65 - 99 mg/dL   ACCU-CHEK GLUCOSE    Collection Time: 01/15/21 12:46 AM   Result Value Ref Range    Glucose - Accu-Ck 315 (H) 65 - 99 mg/dL   CBC with Differential    Collection Time: 01/15/21  1:00 AM   Result Value Ref Range    WBC 10.4 4.8 - 10.8 K/uL    RBC 4.53 (L) 4.70 - 6.10 M/uL    Hemoglobin 14.7 14.0 - 18.0 g/dL    Hematocrit 40.3 (L) 42.0 - 52.0 %    MCV 89.0 81.4 - 97.8 fL    MCH 32.5 27.0 - 33.0 pg    MCHC 36.5 (H) 33.7 - 35.3 g/dL    RDW 40.0 35.9 - 50.0 fL    Platelet Count 245 164 - 446 K/uL    MPV 9.8 9.0 - 12.9 fL    Neutrophils-Polys 72.80 (H) 44.00 - 72.00 %    Lymphocytes 18.70 (L) 22.00 - 41.00 %    Monocytes 7.20 0.00 - 13.40 %    Eosinophils 0.10 0.00 - 6.90 %    Basophils 0.40 0.00 - 1.80 %    Immature Granulocytes 0.80 0.00 - 0.90 %    Nucleated RBC 0.00 /100 WBC    Neutrophils (Absolute) 7.58 (H) 1.82 - 7.42 K/uL    Lymphs (Absolute) 1.95 1.00 - 4.80 K/uL    Monos (Absolute) 0.75 0.00 - 0.85 K/uL    Eos (Absolute) 0.01 0.00 - 0.51 K/uL    Baso (Absolute) 0.04 0.00 - 0.12 K/uL    Immature Granulocytes (abs) 0.08 0.00 - 0.11 K/uL    NRBC (Absolute) 0.00 K/uL   Comp Metabolic Panel    Collection Time: 01/15/21  1:00 AM   Result Value Ref Range    Sodium 132 (L) 135 - 145 mmol/L    Potassium 3.2 (L) 3.6 - 5.5 mmol/L    Chloride 100 96 - 112 mmol/L    Co2 8 (LL) 20 - 33 mmol/L    Anion Gap 24.0 (H) 7.0 - 16.0    Glucose 336 (H) 65 - 99 mg/dL    Bun 11 8 - 22 mg/dL    Creatinine 0.90 0.50 - 1.40 mg/dL    Calcium 8.7 8.4 - 10.2 mg/dL    AST(SGOT) 11  (L) 12 - 45 U/L    ALT(SGPT) 23 2 - 50 U/L    Alkaline Phosphatase 97 30 - 99 U/L    Total Bilirubin 0.3 0.1 - 1.5 mg/dL    Albumin 4.0 3.2 - 4.9 g/dL    Total Protein 6.8 6.0 - 8.2 g/dL    Globulin 2.8 1.9 - 3.5 g/dL    A-G Ratio 1.4 g/dL   Phosphorus    Collection Time: 01/15/21  1:00 AM   Result Value Ref Range    Phosphorus 2.0 (L) 2.5 - 4.5 mg/dL   Magnesium    Collection Time: 01/15/21  1:00 AM   Result Value Ref Range    Magnesium 1.9 1.5 - 2.5 mg/dL   ESTIMATED GFR    Collection Time: 01/15/21  1:00 AM   Result Value Ref Range    GFR If African American >60 >60 mL/min/1.73 m 2    GFR If Non African American >60 >60 mL/min/1.73 m 2   ACCU-CHEK GLUCOSE    Collection Time: 01/15/21  1:50 AM   Result Value Ref Range    Glucose - Accu-Ck 266 (H) 65 - 99 mg/dL   ACCU-CHEK GLUCOSE    Collection Time: 01/15/21  2:47 AM   Result Value Ref Range    Glucose - Accu-Ck 249 (H) 65 - 99 mg/dL   ACCU-CHEK GLUCOSE    Collection Time: 01/15/21  3:47 AM   Result Value Ref Range    Glucose - Accu-Ck 238 (H) 65 - 99 mg/dL   Basic Metabolic Panel (BMP)    Collection Time: 01/15/21  7:40 AM   Result Value Ref Range    Sodium 135 135 - 145 mmol/L    Potassium 3.2 (L) 3.6 - 5.5 mmol/L    Chloride 106 96 - 112 mmol/L    Co2 16 (L) 20 - 33 mmol/L    Glucose 207 (H) 65 - 99 mg/dL    Bun 11 8 - 22 mg/dL    Creatinine 0.75 0.50 - 1.40 mg/dL    Calcium 8.9 8.4 - 10.2 mg/dL    Anion Gap 13.0 7.0 - 16.0   Phosphorus    Collection Time: 01/15/21  7:40 AM   Result Value Ref Range    Phosphorus 1.6 (L) 2.5 - 4.5 mg/dL   Magnesium    Collection Time: 01/15/21  7:40 AM   Result Value Ref Range    Magnesium 2.4 1.5 - 2.5 mg/dL   ESTIMATED GFR    Collection Time: 01/15/21  7:40 AM   Result Value Ref Range    GFR If African American >60 >60 mL/min/1.73 m 2    GFR If Non African American >60 >60 mL/min/1.73 m 2   Basic Metabolic Panel (BMP)    Collection Time: 01/15/21 11:50 AM   Result Value Ref Range    Sodium 135 135 - 145 mmol/L    Potassium 3.7  3.6 - 5.5 mmol/L    Chloride 107 96 - 112 mmol/L    Co2 16 (L) 20 - 33 mmol/L    Glucose 149 (H) 65 - 99 mg/dL    Bun 11 8 - 22 mg/dL    Creatinine 0.75 0.50 - 1.40 mg/dL    Calcium 8.7 8.4 - 10.2 mg/dL    Anion Gap 12.0 7.0 - 16.0   ESTIMATED GFR    Collection Time: 01/15/21 11:50 AM   Result Value Ref Range    GFR If African American >60 >60 mL/min/1.73 m 2    GFR If Non African American >60 >60 mL/min/1.73 m 2     Imaging  DX-CHEST-PORTABLE (1 VIEW)   Final Result         1.  No focal infiltrates.   2.  Perihilar interstitial prominence and bronchial wall cuffing, appearance suggests changes of underlying bronchial inflammation, consider bronchitis.        Assessment/Plan    * DKA (diabetic ketoacidoses) ([facility])  Assessment & Plan  No identified inciting event  Severe acidemia on admission is improving with insulin drip  Anion gap still elevated due to ketosis and lactic acidosis  Continue insulin drip/DKA protocol  Start dextrose IVF  Aggressively replete K and phos per protocol  Closely monitor on telemetry  NPO  Check A1c  Diabetic education  SW referral to establish with PCP    Discussed patient condition and risk of morbidity and/or mortality with RN, Pharmacy and Patient.      The patient remains critically ill.  Critical care time = 35 minutes in directly providing and coordinating critical care and extensive data review.  No time overlap and excludes procedures.    This note was generated using voice recognition software which has a chance of producing errors of grammar and content.  I have made every reasonable attempt to find and correct any errors, but it should be expected that some may not be found prior to finalization of this note.  __________  Jeovany Sage MD  Pulmonary and Critical Care Medicine  Formerly [facility]

## 2021-01-15 NOTE — ED NOTES
Fanny from Lab called with critical result of Ph 7.09 at 2217. Critical lab result read back to Fanny.   Dr. Baker notified of critical lab result at 2217.  Critical lab result read back by Dr. Baker .

## 2021-01-15 NOTE — ASSESSMENT & PLAN NOTE
Has resolved  His hema1c is 11.4  On s.s.insulin  On lantus  Diabetic education consult  accu check results are noted  Probable dc home on 1/18/21

## 2021-01-15 NOTE — PROGRESS NOTES
Assumed care of patient, report received from RUBI Mota.  Verified lines and gtts.  VSS.  Patient resting in bed, safety precautions in place.  No needs at this time.    left hip pain

## 2021-01-15 NOTE — H&P
Hospital Medicine History & Physical Note    Date of Service  1/15/2021    Primary Care Physician  Pcp Pt States None    Consultants  None    Code Status  Full Code    Chief Complaint  Chief Complaint   Patient presents with   • N/V   • Constipation       History of Presenting Illness  25 y.o. male, no significant past medical history who reports quitting chewing tobacco a few days ago and who presented to the emergency department on 1/14/2021 with complains of generalized weakness, 7 episodes of nonbloody emesis, fatigue.  He initially attributed his symptoms to nicotine withdrawal but since it was not improving he decided to come to the emergency department for further evaluation.  Patient denies having fever, cough, chest pain, loss of smell or taste or exposures to COVID-19.    ER COURSE:  -Afebrile, tachycardic with heart rate of 95 bpm.  -Laboratory showing significantly elevated glucose at 580, with elevated anion gap of 30.0, CO2 of 8 and acidotic with VBG showing pH of 7.09.  -Patient does reports that his brother was diagnosed with type 1 diabetes 20 years ago.  -Patient was started on IV fluids and insulin gtt no was called for admission.    Review of Systems  Review of Systems   Constitutional: Positive for malaise/fatigue. Negative for fever.   HENT: Negative for congestion and sore throat.    Eyes: Negative for blurred vision and double vision.   Respiratory: Negative for cough and shortness of breath.    Cardiovascular: Negative for chest pain and palpitations.   Gastrointestinal: Positive for abdominal pain, nausea and vomiting.   Genitourinary: Negative for dysuria and urgency.   Musculoskeletal: Positive for back pain. Negative for myalgias and neck pain.   Skin: Negative for itching and rash.   Neurological: Negative for dizziness, weakness and headaches.   Endo/Heme/Allergies: Does not bruise/bleed easily.   Psychiatric/Behavioral: Negative for depression. The patient does not have insomnia.         Past Medical History   has a past medical history of Vaso-vagal reaction (9/30/2014).    Surgical History  Tonsillectomy    Family History  Brother has type 1 diabetes    Social History   reports that he has never smoked. He has quit using smokeless tobacco.  His smokeless tobacco use included chew. He reports that he does not drink alcohol or use drugs.    Allergies  No Known Allergies    Medications  Prior to Admission Medications   Prescriptions Last Dose Informant Patient Reported? Taking?   IBUPROFEN PO 1/11/2021 at Unknown time  Yes No   Sig: Take 600 mg by mouth.   Probiotic Product (PROBIOTIC-10 PO) 1/14/2021 at Unknown time  Yes Yes   Sig: Take  by mouth.   cetirizine (ZYRTEC) 10 MG TABS 1/14/2021 at 0545  Yes No   Sig: Take  by mouth every day.   docusate sodium (COLACE) 100 MG Cap 1/14/2021 at Unknown time  Yes Yes   Sig: Take 100 mg by mouth 2 times a day.      Facility-Administered Medications: None       Physical Exam  Temp:  [36.7 °C (98.1 °F)] 36.7 °C (98.1 °F)  Pulse:  [95] 95  Resp:  [16] 16  BP: (143)/(93) 143/93  SpO2:  [98 %] 98 %    Physical Exam  Constitutional:       Appearance: Normal appearance.   HENT:      Head: Normocephalic and atraumatic.      Nose: Nose normal.      Mouth/Throat:      Mouth: Mucous membranes are moist.      Pharynx: Oropharynx is clear.   Eyes:      Extraocular Movements: Extraocular movements intact.      Pupils: Pupils are equal, round, and reactive to light.   Neck:      Musculoskeletal: Normal range of motion and neck supple.   Cardiovascular:      Rate and Rhythm: Normal rate and regular rhythm.      Pulses: Normal pulses.      Heart sounds: Normal heart sounds.   Pulmonary:      Effort: Pulmonary effort is normal.      Breath sounds: Normal breath sounds.   Abdominal:      General: Abdomen is flat. Bowel sounds are normal.      Palpations: Abdomen is soft.   Skin:     General: Skin is warm and dry.   Neurological:      General: No focal deficit present.       Mental Status: He is alert and oriented to person, place, and time.   Psychiatric:         Mood and Affect: Mood normal.         Behavior: Behavior normal.         Laboratory:  Recent Labs     01/14/21  2208   WBC 10.2   RBC 5.43   HEMOGLOBIN 17.3   HEMATOCRIT 49.1   MCV 90.4   MCH 31.9   MCHC 35.2   RDW 40.4   PLATELETCT 282   MPV 9.9     Recent Labs     01/14/21  2101   SODIUM 131*   POTASSIUM 4.7   CHLORIDE 93*   CO2 8*   GLUCOSE 580*   BUN 12   CREATININE 1.13   CALCIUM 9.9     Recent Labs     01/14/21  2101   ALTSGPT 30   ASTSGOT 18   ALKPHOSPHAT 128*   TBILIRUBIN 0.4   LIPASE 32   GLUCOSE 580*         No results for input(s): NTPROBNP in the last 72 hours.      No results for input(s): TROPONINT in the last 72 hours.    Imaging:  DX-CHEST-PORTABLE (1 VIEW)   Final Result         1.  No focal infiltrates.   2.  Perihilar interstitial prominence and bronchial wall cuffing, appearance suggests changes of underlying bronchial inflammation, consider bronchitis.            Assessment/Plan:  I anticipate this patient will require at least two midnights for appropriate medical management, necessitating inpatient admission.    * DKA (diabetic ketoacidoses) ([facility])  Assessment & Plan  -Patient be admitted to intensive care unit.  -N.p.o. for now  -Pain and nausea control PRN  -Initial anion gap: 30  -Patient is getting started on his insulin gtt  -IV fluids given: Normal saline, 2 L ordered  -Starting DKA orders with every 4 hours BMP, potassium replacement and IV fluids according to glucose level  -Patient regular regimen for diabetes includes: New onset diabetes with DKA.      DVT Ppx: Lovenox

## 2021-01-15 NOTE — ED NOTES
Assumed patient care. Pt assesement done.  Plan of care reviewed with patient. IV established. Blood sent to lab.

## 2021-01-15 NOTE — ED PROVIDER NOTES
ED Provider Note    CHIEF COMPLAINT  Chief Complaint   Patient presents with   • N/V   • Constipation       HPI  Ho Thompson is a 25 y.o. male, reportedly otherwise healthy, who presents with nausea vomiting and constipation.  The patient states that he stopped using chewing tobacco approximately 8 days ago and thought that he was possibly having withdrawal from this.  He states that over the last week he has developed dry mouth, polyuria and polydipsia as well as significant constipation.  He developed nausea and vomiting over the last few days and had multiple episodes of vomiting today.  He denies any recent infectious symptoms including fevers, coughing, sore throat.  He has had some body aches.  No dysuria or hematuria.  No known Covid exposures.  The patient does have a brother who is a type I diabetic however has never been told that he has high blood sugar issues with diabetes.  He is currently complaining of a minor frontal headache however no other complaints of pain.    REVIEW OF SYSTEMS  See HPI for further details.   Review of Systems   Constitutional: Positive for malaise/fatigue and weight loss. Negative for chills and fever.   HENT: Negative for sore throat.    Eyes: Negative for blurred vision and redness.   Respiratory: Negative for cough and shortness of breath.    Cardiovascular: Negative for chest pain and leg swelling.   Gastrointestinal: Positive for constipation, nausea and vomiting. Negative for abdominal pain.   Genitourinary: Positive for frequency. Negative for dysuria and urgency.   Musculoskeletal: Negative for back pain and neck pain.   Skin: Negative for rash.   Neurological: Positive for headaches. Negative for focal weakness and seizures.   Endo/Heme/Allergies: Positive for polydipsia.   Psychiatric/Behavioral: Negative for suicidal ideas.         PAST MEDICAL HISTORY   has a past medical history of Vaso-vagal reaction (9/30/2014).    SOCIAL HISTORY  Social History      Tobacco Use   • Smoking status: Never Smoker   • Smokeless tobacco: Former User     Types: Chew   Substance and Sexual Activity   • Alcohol use: No   • Drug use: No   • Sexual activity: Yes       SURGICAL HISTORY  patient denies any surgical history    CURRENT MEDICATIONS  Home Medications     Reviewed by Nakul Gutierrez R.N. (Registered Nurse) on 01/15/21 at 0056  Med List Status: Complete   Medication Last Dose Status   cetirizine (ZYRTEC) 10 MG TABS 1/14/2021 Active   docusate sodium (COLACE) 100 MG Cap 1/14/2021 Active   IBUPROFEN PO 1/11/2021 Active   Probiotic Product (PROBIOTIC-10 PO) 1/14/2021 Active                ALLERGIES  No Known Allergies    PHYSICAL EXAM   VITAL SIGNS: /79   Pulse 75   Temp 36.8 °C (98.2 °F) (Temporal)   Resp 16   Ht 1.829 m (6')   Wt 78 kg (171 lb 15.3 oz)   SpO2 94%   BMI 23.32 kg/m²      Physical Exam   Constitutional: He is oriented to person, place, and time and well-developed, well-nourished, and in no distress. No distress.   Nontoxic appearing young male   HENT:   Head: Normocephalic and atraumatic.   Dry mucous membrane   Eyes: Pupils are equal, round, and reactive to light. Conjunctivae are normal.   Neck: Normal range of motion. Neck supple.   Cardiovascular: Regular rhythm and normal heart sounds.   Tachycardic   Pulmonary/Chest: Effort normal and breath sounds normal. No respiratory distress.   Abdominal: Soft. He exhibits no distension. There is no abdominal tenderness.   Musculoskeletal: Normal range of motion.         General: No tenderness or edema.   Neurological: He is alert and oriented to person, place, and time.   Moving all extremities spontaneously   Skin: Skin is warm and dry.   Psychiatric: Affect normal.         DIAGNOSTIC STUDIES      LABS  Personally reviewed by me  Labs Reviewed   COMP METABOLIC PANEL - Abnormal; Notable for the following components:       Result Value    Sodium 131 (*)     Chloride 93 (*)     Co2 8 (*)     Anion  Gap 30.0 (*)     Glucose 580 (*)     Alkaline Phosphatase 128 (*)     Albumin 5.0 (*)     Total Protein 8.8 (*)     Globulin 3.8 (*)     All other components within normal limits   URINALYSIS,CULTURE IF INDICATED - Abnormal; Notable for the following components:    Glucose 500 (*)     Ketones >=80 (*)     Protein 30 (*)     Occult Blood Small (*)     All other components within normal limits    Narrative:     Indication for culture:->Evaluation for sepsis without a  clear source of infection  ** protocol   CBC WITH DIFFERENTIAL - Abnormal; Notable for the following components:    Neutrophils-Polys 84.20 (*)     Lymphocytes 10.30 (*)     Neutrophils (Absolute) 8.63 (*)     All other components within normal limits   URINE MICROSCOPIC (W/UA) - Abnormal; Notable for the following components:    WBC 0-2 (*)     RBC 0-2 (*)     Bacteria Rare (*)     All other components within normal limits    Narrative:     Indication for culture:->Evaluation for sepsis without a  clear source of infection  ** protocol   VENOUS BLOOD GAS - Abnormal; Notable for the following components:    Venous Bg Ph 7.09 (*)     Venous Bg Pco2 27.7 (*)     Venous Bg Hco3 8 (*)     All other components within normal limits   BETA-HYDROXYBUTYRIC ACID - Abnormal; Notable for the following components:    beta-Hydroxybutyric Acid >8.00 (*)     All other components within normal limits   CBC WITH DIFFERENTIAL - Abnormal; Notable for the following components:    RBC 4.53 (*)     Hematocrit 40.3 (*)     MCHC 36.5 (*)     Neutrophils-Polys 72.80 (*)     Lymphocytes 18.70 (*)     Neutrophils (Absolute) 7.58 (*)     All other components within normal limits   ACCU-CHEK GLUCOSE - Abnormal; Notable for the following components:    Glucose - Accu-Ck 382 (*)     All other components within normal limits   LIPASE   REFRACTOMETER SG    Narrative:     Indication for culture:->Evaluation for sepsis without a  clear source of infection  ** protocol   ESTIMATED GFR    COV-2, FLU A/B, AND RSV BY PCR    Narrative:     Have you been in close contact with a person who is suspected  or known to be positive for COVID-19 within the last 30 days  (e.g. last seen that person < 30 days ago)->Unknown   COMP METABOLIC PANEL   PHOSPHORUS   MAGNESIUM   ESTIMATED GFR   BASIC METABOLIC PANEL           RADIOLOGY  Personally reviewed by me  DX-CHEST-PORTABLE (1 VIEW)   Final Result         1.  No focal infiltrates.   2.  Perihilar interstitial prominence and bronchial wall cuffing, appearance suggests changes of underlying bronchial inflammation, consider bronchitis.          ED COURSE  Vitals:    01/14/21 1957 01/14/21 2001 01/15/21 0100   BP:  143/93 127/79   Pulse:  95 75   Resp:  16 16   Temp:  36.7 °C (98.1 °F) 36.8 °C (98.2 °F)   TempSrc:   Temporal   SpO2:  98% 94%   Weight: 80.4 kg (177 lb 4 oz)  78 kg (171 lb 15.3 oz)   Height: 1.829 m (6')           Medications administered:  IVF, insulin drip, APAP    Patient was given IV fluids for hyperglycemia/DKA.  IV hydration was used because oral hydration was not adequate alone.  Following fluid administration patient's hydration status, vital were improved.      Old records personally reviewed:  None pertinent        MEDICAL DECISION MAKING  Otherwise healthy young male who presents with 1 week history of vomiting, polyuria, polydipsia.  He afebrile, tachycardic with otherwise reassuring vital signs.  Abdominal exam is benign without concern for underlying surgical process.   His labs returned showing DKA from new onset diabetes with a significant acidosis as well as hyperglycemia.  He has no other significant electrolyte abnormalities.  No recent fevers or infectious symptoms.  Chest x-ray showed possible signs of bronchitis but no pneumonia.  Urinalysis was negative for infection.  COVID-19 testing was negative as well.  Patient has no known history of diabetes however does have positive family history.  He was given fluid resuscitation  here and insulin drip was initiated in the emergency department.    Upon reassessment, patient is resting comfortably with improved vital signs.  No new complaints at this time.  Discussed results with patient and/or family as well as plan of care for admission.  He is agreeable at this time.    I discussed the case with Dr. Lewis, hospitalist on-call, who accepts admission of the patient.  The patient is in guarded condition at the time of transfer to the ICU.    CRITICAL CARE TIME  Upon my evaluation, this patient had a high probability of imminent or life-threatening deterioration due to DKA requiring insulin drip which required my direct attention, intervention, and personal management.     I personally provided 36 minutes of total critical care time outside of time spent on separately billable/documented procedures. Time includes: review of laboratory data, review of radiology studies, discussion with consultants, discussion with family/patient, monitoring for potential decompensation.  Interventions were performed as documented above.             IMPRESSION  (E10.10) Diabetic ketoacidosis without coma associated with type 1 diabetes mellitus (HCC)    Disposition: Admit medicine, critical condition  Results, diagnoses, and treatment options were discussed with the patient and/or family. Patient verbalized understanding of plan of care.    Patient referred to primary care provider for monitoring and treatment of blood pressure.      Current Discharge Medication List              Electronically signed by: Yesy Baker M.D., 1/14/2021 10:21 PM

## 2021-01-15 NOTE — PROGRESS NOTES
Fanny from Lab called with critical result of CO2 8 at 0137. Critical lab result read back to Fanny.   Dr. Lewis notified of critical lab result at 0148.  Critical lab result read back by Dr. Lewis.

## 2021-01-15 NOTE — ASSESSMENT & PLAN NOTE
No identified inciting event  Severe acidemia on admission is improving with insulin drip  Anion gap still elevated due to ketosis and lactic acidosis  Continue insulin drip/DKA protocol  Reduce dextrose IVF rate  Aggressively replete K, Mg, Phos and phos per protocol  Closely monitor on telemetry  NPO  A1c in process  Diabetic education  SW referral to establish with PCP

## 2021-01-15 NOTE — PROGRESS NOTES
Notified Dr Sage ST-elevation appears increased; EKG confirms ST elevation.  Patient denies chest pain/pressure, SOB or increased work of breathing.

## 2021-01-15 NOTE — DISCHARGE PLANNING
Pt has new PCP appointment set for 1/20/21 at the [facility] in [place]. Pts appointment time is 8:00am. Information will be uploaded in AVS.

## 2021-01-15 NOTE — ED TRIAGE NOTES
Pt ambulates to triage with wife  Chief Complaint   Patient presents with   • N/V   • Constipation   N/V x 7 days, weight loss  Quit chewing tobacco and nicotine packets 7 days ago  Pt reports 20 lb weight loss in 7 days    Pt A & 0 x 4, speech clear, ambulates well  COVID-19 screening criteria completed, pt denies high risk travel and denies contact with COVID-19 positive pt    Pt updated on triage process and asked to inform RN of any changes while waiting in lobby.

## 2021-01-16 PROBLEM — E83.39 HYPOPHOSPHATEMIA: Status: ACTIVE | Noted: 2021-01-16

## 2021-01-16 PROBLEM — E87.6 HYPOKALEMIA, ECF TO ICF SHIFTS: Status: ACTIVE | Noted: 2021-01-16

## 2021-01-16 PROBLEM — E83.42 HYPOMAGNESEMIA: Status: ACTIVE | Noted: 2021-01-16

## 2021-01-16 LAB
ANION GAP SERPL CALC-SCNC: 11 MMOL/L (ref 7–16)
ANION GAP SERPL CALC-SCNC: 9 MMOL/L (ref 7–16)
ANION GAP SERPL CALC-SCNC: 9 MMOL/L (ref 7–16)
BASE EXCESS BLDV CALC-SCNC: -10 MMOL/L
BODY TEMPERATURE: ABNORMAL CENTIGRADE
BUN SERPL-MCNC: 10 MG/DL (ref 8–22)
BUN SERPL-MCNC: 6 MG/DL (ref 8–22)
BUN SERPL-MCNC: 8 MG/DL (ref 8–22)
CALCIUM SERPL-MCNC: 8.5 MG/DL (ref 8.4–10.2)
CALCIUM SERPL-MCNC: 8.6 MG/DL (ref 8.4–10.2)
CALCIUM SERPL-MCNC: 8.6 MG/DL (ref 8.4–10.2)
CHLORIDE SERPL-SCNC: 107 MMOL/L (ref 96–112)
CHLORIDE SERPL-SCNC: 108 MMOL/L (ref 96–112)
CHLORIDE SERPL-SCNC: 109 MMOL/L (ref 96–112)
CO2 SERPL-SCNC: 16 MMOL/L (ref 20–33)
CO2 SERPL-SCNC: 17 MMOL/L (ref 20–33)
CO2 SERPL-SCNC: 17 MMOL/L (ref 20–33)
CREAT SERPL-MCNC: 0.47 MG/DL (ref 0.5–1.4)
CREAT SERPL-MCNC: 0.57 MG/DL (ref 0.5–1.4)
CREAT SERPL-MCNC: 0.58 MG/DL (ref 0.5–1.4)
ERYTHROCYTE [DISTWIDTH] IN BLOOD BY AUTOMATED COUNT: 40.8 FL (ref 35.9–50)
GLUCOSE BLD-MCNC: 107 MG/DL (ref 65–99)
GLUCOSE BLD-MCNC: 117 MG/DL (ref 65–99)
GLUCOSE BLD-MCNC: 128 MG/DL (ref 65–99)
GLUCOSE BLD-MCNC: 130 MG/DL (ref 65–99)
GLUCOSE BLD-MCNC: 138 MG/DL (ref 65–99)
GLUCOSE BLD-MCNC: 144 MG/DL (ref 65–99)
GLUCOSE BLD-MCNC: 144 MG/DL (ref 65–99)
GLUCOSE BLD-MCNC: 146 MG/DL (ref 65–99)
GLUCOSE BLD-MCNC: 152 MG/DL (ref 65–99)
GLUCOSE BLD-MCNC: 204 MG/DL (ref 65–99)
GLUCOSE BLD-MCNC: 233 MG/DL (ref 65–99)
GLUCOSE BLD-MCNC: 270 MG/DL (ref 65–99)
GLUCOSE BLD-MCNC: 86 MG/DL (ref 65–99)
GLUCOSE BLD-MCNC: 99 MG/DL (ref 65–99)
GLUCOSE SERPL-MCNC: 115 MG/DL (ref 65–99)
GLUCOSE SERPL-MCNC: 136 MG/DL (ref 65–99)
GLUCOSE SERPL-MCNC: 143 MG/DL (ref 65–99)
HCO3 BLDV-SCNC: 14 MMOL/L (ref 24–28)
HCT VFR BLD AUTO: 35.7 % (ref 42–52)
HGB BLD-MCNC: 12.9 G/DL (ref 14–18)
MAGNESIUM SERPL-MCNC: 1.9 MG/DL (ref 1.5–2.5)
MCH RBC QN AUTO: 32.2 PG (ref 27–33)
MCHC RBC AUTO-ENTMCNC: 36.1 G/DL (ref 33.7–35.3)
MCV RBC AUTO: 89 FL (ref 81.4–97.8)
PCO2 BLDV: 26.7 MMHG (ref 41–51)
PH BLDV: 7.35 [PH] (ref 7.31–7.45)
PHOSPHATE SERPL-MCNC: 1.6 MG/DL (ref 2.5–4.5)
PLATELET # BLD AUTO: 185 K/UL (ref 164–446)
PMV BLD AUTO: 9.7 FL (ref 9–12.9)
PO2 BLDV: 91.1 MMHG (ref 25–40)
POTASSIUM SERPL-SCNC: 2.8 MMOL/L (ref 3.6–5.5)
POTASSIUM SERPL-SCNC: 3.2 MMOL/L (ref 3.6–5.5)
POTASSIUM SERPL-SCNC: 3.4 MMOL/L (ref 3.6–5.5)
RBC # BLD AUTO: 4.01 M/UL (ref 4.7–6.1)
SAO2 % BLDV: 96.7 %
SODIUM SERPL-SCNC: 133 MMOL/L (ref 135–145)
SODIUM SERPL-SCNC: 135 MMOL/L (ref 135–145)
SODIUM SERPL-SCNC: 135 MMOL/L (ref 135–145)
WBC # BLD AUTO: 7.9 K/UL (ref 4.8–10.8)

## 2021-01-16 PROCEDURE — 84100 ASSAY OF PHOSPHORUS: CPT

## 2021-01-16 PROCEDURE — 770006 HCHG ROOM/CARE - MED/SURG/GYN SEMI*

## 2021-01-16 PROCEDURE — 700111 HCHG RX REV CODE 636 W/ 250 OVERRIDE (IP): Performed by: INTERNAL MEDICINE

## 2021-01-16 PROCEDURE — 700105 HCHG RX REV CODE 258: Performed by: HOSPITALIST

## 2021-01-16 PROCEDURE — A9270 NON-COVERED ITEM OR SERVICE: HCPCS | Performed by: INTERNAL MEDICINE

## 2021-01-16 PROCEDURE — 94760 N-INVAS EAR/PLS OXIMETRY 1: CPT

## 2021-01-16 PROCEDURE — 700101 HCHG RX REV CODE 250

## 2021-01-16 PROCEDURE — 700105 HCHG RX REV CODE 258: Performed by: INTERNAL MEDICINE

## 2021-01-16 PROCEDURE — 80048 BASIC METABOLIC PNL TOTAL CA: CPT

## 2021-01-16 PROCEDURE — 99291 CRITICAL CARE FIRST HOUR: CPT | Performed by: INTERNAL MEDICINE

## 2021-01-16 PROCEDURE — 700102 HCHG RX REV CODE 250 W/ 637 OVERRIDE(OP): Performed by: HOSPITALIST

## 2021-01-16 PROCEDURE — 82803 BLOOD GASES ANY COMBINATION: CPT

## 2021-01-16 PROCEDURE — 82962 GLUCOSE BLOOD TEST: CPT

## 2021-01-16 PROCEDURE — 85027 COMPLETE CBC AUTOMATED: CPT

## 2021-01-16 PROCEDURE — 83735 ASSAY OF MAGNESIUM: CPT

## 2021-01-16 PROCEDURE — 700102 HCHG RX REV CODE 250 W/ 637 OVERRIDE(OP): Performed by: INTERNAL MEDICINE

## 2021-01-16 PROCEDURE — 700111 HCHG RX REV CODE 636 W/ 250 OVERRIDE (IP): Performed by: HOSPITALIST

## 2021-01-16 RX ORDER — POTASSIUM CHLORIDE 7.45 MG/ML
10 INJECTION INTRAVENOUS
Status: DISCONTINUED | OUTPATIENT
Start: 2021-01-16 | End: 2021-01-16

## 2021-01-16 RX ORDER — POTASSIUM CHLORIDE 20 MEQ/1
40 TABLET, EXTENDED RELEASE ORAL ONCE
Status: COMPLETED | OUTPATIENT
Start: 2021-01-16 | End: 2021-01-16

## 2021-01-16 RX ORDER — MAGNESIUM SULFATE HEPTAHYDRATE 40 MG/ML
2 INJECTION, SOLUTION INTRAVENOUS ONCE
Status: COMPLETED | OUTPATIENT
Start: 2021-01-16 | End: 2021-01-16

## 2021-01-16 RX ORDER — POTASSIUM CHLORIDE 7.45 MG/ML
10 INJECTION INTRAVENOUS
Status: DISCONTINUED | OUTPATIENT
Start: 2021-01-16 | End: 2021-01-16 | Stop reason: ALTCHOICE

## 2021-01-16 RX ORDER — DEXTROSE MONOHYDRATE 25 G/50ML
50 INJECTION, SOLUTION INTRAVENOUS
Status: DISCONTINUED | OUTPATIENT
Start: 2021-01-16 | End: 2021-01-17 | Stop reason: HOSPADM

## 2021-01-16 RX ORDER — INSULIN GLARGINE 100 [IU]/ML
12 INJECTION, SOLUTION SUBCUTANEOUS
Status: DISCONTINUED | OUTPATIENT
Start: 2021-01-16 | End: 2021-01-17 | Stop reason: HOSPADM

## 2021-01-16 RX ORDER — POTASSIUM CHLORIDE 7.45 MG/ML
10 INJECTION INTRAVENOUS
Status: COMPLETED | OUTPATIENT
Start: 2021-01-16 | End: 2021-01-16

## 2021-01-16 RX ADMIN — INSULIN LISPRO 5 UNITS: 100 INJECTION, SOLUTION INTRAVENOUS; SUBCUTANEOUS at 17:25

## 2021-01-16 RX ADMIN — POTASSIUM CHLORIDE 10 MEQ: 7.46 INJECTION, SOLUTION INTRAVENOUS at 07:25

## 2021-01-16 RX ADMIN — POTASSIUM CHLORIDE 10 MEQ: 7.46 INJECTION, SOLUTION INTRAVENOUS at 10:32

## 2021-01-16 RX ADMIN — DEXTROSE AND SODIUM CHLORIDE: 10; .45 INJECTION, SOLUTION INTRAVENOUS at 11:41

## 2021-01-16 RX ADMIN — DEXTROSE AND SODIUM CHLORIDE: 10; .45 INJECTION, SOLUTION INTRAVENOUS at 01:14

## 2021-01-16 RX ADMIN — POTASSIUM CHLORIDE 10 MEQ: 7.46 INJECTION, SOLUTION INTRAVENOUS at 00:02

## 2021-01-16 RX ADMIN — INSULIN LISPRO 5 UNITS: 100 INJECTION, SOLUTION INTRAVENOUS; SUBCUTANEOUS at 17:24

## 2021-01-16 RX ADMIN — POTASSIUM CHLORIDE 40 MEQ: 1500 TABLET, EXTENDED RELEASE ORAL at 14:11

## 2021-01-16 RX ADMIN — MAGNESIUM SULFATE 2 G: 2 INJECTION INTRAVENOUS at 11:36

## 2021-01-16 RX ADMIN — INSULIN GLARGINE 12 UNITS: 100 INJECTION, SOLUTION SUBCUTANEOUS at 13:11

## 2021-01-16 RX ADMIN — ENOXAPARIN SODIUM 40 MG: 40 INJECTION SUBCUTANEOUS at 09:15

## 2021-01-16 RX ADMIN — POTASSIUM CHLORIDE 10 MEQ: 7.46 INJECTION, SOLUTION INTRAVENOUS at 08:16

## 2021-01-16 RX ADMIN — POTASSIUM CHLORIDE 10 MEQ: 7.46 INJECTION, SOLUTION INTRAVENOUS at 09:21

## 2021-01-16 RX ADMIN — SODIUM CHLORIDE 4 UNITS/HR: 9 INJECTION, SOLUTION INTRAVENOUS at 07:32

## 2021-01-16 RX ADMIN — POTASSIUM PHOSPHATE, MONOBASIC AND POTASSIUM PHOSPHATE, DIBASIC 45 MMOL: 224; 236 INJECTION, SOLUTION, CONCENTRATE INTRAVENOUS at 02:01

## 2021-01-16 ASSESSMENT — PAIN DESCRIPTION - PAIN TYPE
TYPE: ACUTE PAIN

## 2021-01-16 ASSESSMENT — ENCOUNTER SYMPTOMS
NAUSEA: 0
FEVER: 0
SINUS PAIN: 0
SORE THROAT: 0
SENSORY CHANGE: 0
FOCAL WEAKNESS: 0
MYALGIAS: 0
SHORTNESS OF BREATH: 0
ABDOMINAL PAIN: 0
CHILLS: 0
EYE DISCHARGE: 0
PSYCHIATRIC NEGATIVE: 1
ORTHOPNEA: 0
WHEEZING: 0
SPUTUM PRODUCTION: 0
WEIGHT LOSS: 0
COUGH: 0
DIARRHEA: 0
LOSS OF CONSCIOUSNESS: 0
STRIDOR: 0
DIZZINESS: 0
VOMITING: 0
HEADACHES: 0
EYE PAIN: 0

## 2021-01-16 NOTE — PROGRESS NOTES
Report given to Tere, ELSY, assessment, medications reviewed. Pt aox4, denies pain, eupneic on room air VSS.

## 2021-01-16 NOTE — PROGRESS NOTES
Critical Care Progress Note    Date of admission  1/14/2021    Chief Complaint  25 y.o. male admitted 1/14/2021 with diabetic ketoacidosis    Hospital Course  25 y.o. male who presented 1/14/2021 with generalized weakness, nonbloody emesis.  He chews tobacco and follow-up the symptoms were attributable to nicotine withdrawal.  In the ER he was afebrile, tachycardic with a heart rate.  Labs are notable for an elevated glucose of 580, anion gap 30, bicarb 8, VBG pH 7.09.  He has a family history of type 1 diabetes in his brother but no personal history.    Interval Problem Update  Reviewed last 24 hour events:    No acute events overnight   Gap closed but still mildly acidotic requiring insulin gtt @ 4u/hr  On dextrose IVF  KAYE Anglin repleted  NPO    Review of Systems  Review of Systems   Constitutional: Negative for chills, fever and weight loss.   HENT: Negative for congestion, sinus pain and sore throat.    Eyes: Negative for pain and discharge.   Respiratory: Negative for cough, sputum production, shortness of breath, wheezing and stridor.    Cardiovascular: Negative for chest pain, orthopnea and leg swelling.   Gastrointestinal: Negative for abdominal pain, diarrhea, nausea and vomiting.   Genitourinary: Negative for dysuria, frequency and urgency.   Musculoskeletal: Negative for myalgias.   Skin: Negative for rash.   Neurological: Negative for dizziness, sensory change, focal weakness, loss of consciousness and headaches.   Psychiatric/Behavioral: Negative.    All other systems reviewed and are negative.     Vital Signs for last 24 hours   Temp:  [36.7 °C (98 °F)-36.9 °C (98.5 °F)] 36.7 °C (98 °F)  Pulse:  [58-76] 61  Resp:  [5-16] 16  BP: ()/(65-78) 114/73  SpO2:  [94 %-99 %] 94 %    Physical Exam   Physical Exam  Constitutional:       General: He is not in acute distress.     Appearance: He is well-developed. He is not diaphoretic.   HENT:      Nose: Nose normal.      Mouth/Throat:      Pharynx: No  oropharyngeal exudate.   Eyes:      General: No scleral icterus.        Right eye: No discharge.         Left eye: No discharge.      Conjunctiva/sclera: Conjunctivae normal.      Pupils: Pupils are equal, round, and reactive to light.   Neck:      Musculoskeletal: Neck supple.      Thyroid: No thyromegaly.      Vascular: No JVD.      Trachea: No tracheal deviation.   Cardiovascular:      Rate and Rhythm: Normal rate and regular rhythm.      Heart sounds: Normal heart sounds. No murmur.   Pulmonary:      Effort: Pulmonary effort is normal. No respiratory distress.      Breath sounds: Normal breath sounds. No stridor. No wheezing or rales.   Abdominal:      General: There is no distension.      Palpations: Abdomen is soft.      Tenderness: There is no abdominal tenderness. There is no guarding.   Musculoskeletal: Normal range of motion.         General: No tenderness.   Lymphadenopathy:      Cervical: No cervical adenopathy.   Skin:     General: Skin is warm and dry.      Capillary Refill: Capillary refill takes less than 2 seconds.      Coloration: Skin is not pale.      Findings: No erythema.   Neurological:      Mental Status: He is alert and oriented to person, place, and time.      Sensory: No sensory deficit.      Motor: No abnormal muscle tone.      Coordination: Coordination normal.      Deep Tendon Reflexes: Reflexes normal.   Psychiatric:         Behavior: Behavior normal.         Thought Content: Thought content normal.         Judgment: Judgment normal.       Medications  Current Facility-Administered Medications   Medication Dose Route Frequency Provider Last Rate Last Admin   • potassium phosphate 15 mmol in 5% dextrose 500 mL ivpb  15 mmol Intravenous Once Ofe Grigsby M.D. 83.3 mL/hr at 01/16/21 0214 Rate Change not Required at 01/16/21 0214   • potassium chloride (KCL) ivpb 10 mEq  10 mEq Intravenous Q HOUR Ofe Grigsby M.D. 100 mL/hr at 01/16/21 0816 10 mEq at 01/16/21 0816   •  D10%-0.45% NaCl infusion   Intravenous Continuous Jeoavny Sage M.D. 100 mL/hr at 01/16/21 0730 Rate Verify at 01/16/21 0730   • MD ALERT-PHARMACY TO CONSULT FOR DKA MONITORING 1 Each  1 Each Other PRSANDRA Grigsby M.D.       • Adult DKA potassium(K+) replacement scale  1 Each Intravenous Q4HRS Ofe Grigsby M.D.   1 Each at 01/16/21 0600   • acetaminophen (Tylenol) tablet 650 mg  650 mg Oral Q6HRS PRN Ofe Grigsby M.D.       • ondansetron (ZOFRAN) syringe/vial injection 4 mg  4 mg Intravenous Q4HRS PRN Ofe Grigsby M.D.       • ondansetron (ZOFRAN ODT) dispertab 4 mg  4 mg Oral Q4HRS PRN Ofe Grigsby M.D.       • promethazine (PHENERGAN) tablet 12.5-25 mg  12.5-25 mg Oral Q4HRS PRN Ofe Grigsby M.D.       • promethazine (PHENERGAN) suppository 12.5-25 mg  12.5-25 mg Rectal Q4HRS PRN Ofe Grigsby M.D.       • prochlorperazine (COMPAZINE) injection 5-10 mg  5-10 mg Intravenous Q4HRS PRN Ofe Grigsby M.D.       • senna-docusate (PERICOLACE or SENOKOT S) 8.6-50 MG per tablet 2 Tab  2 Tab Oral BID Ofe Grigsby M.D.   Stopped at 01/16/21 0600    And   • polyethylene glycol/lytes (MIRALAX) PACKET 1 Packet  1 Packet Oral QDAY PRN Ofe Grigsby M.D.        And   • magnesium hydroxide (MILK OF MAGNESIA) suspension 30 mL  30 mL Oral QDAY PRN Ofe Grigsby M.D.        And   • bisacodyl (DULCOLAX) suppository 10 mg  10 mg Rectal QDAY PRN Ofe Grigsby M.D.       • morphine (pf) 4 mg/mL injection 2 mg  2 mg Intravenous Q3HRS PRN Ofe Grigsby M.D.       • insulin regular human (HUMULIN/NOVOLIN R) 62.5 Units in  mL Infusion for DKA  4 Units/hr Intravenous Continuous Ofe Grigsby M.D. 16 mL/hr at 01/16/21 0819 4 Units/hr at 01/16/21 0819     Fluids    Intake/Output Summary (Last 24 hours) at 1/16/2021 0824  Last data filed at 1/16/2021 0600  Gross per 24 hour   Intake  3679.42 ml   Output 2100 ml   Net 1579.42 ml     Laboratory          Recent Labs     01/15/21  0740 01/15/21  0740 01/15/21  1550 01/15/21  1959 01/16/21  0006 01/16/21  0441   SODIUM 135   < > 137 135 135 133*   POTASSIUM 3.2*   < > 3.9 3.2* 3.2* 2.8*   CHLORIDE 106   < > 110 110 109 107   CO2 16*   < > 16* 16* 17* 17*   BUN 11   < > 10 10 10 8   CREATININE 0.75   < > 0.68 0.66 0.58 0.57   MAGNESIUM 2.4  --  1.9  --  1.9  --    PHOSPHORUS 1.6*  --  2.2*  --  1.6*  --    CALCIUM 8.9   < > 9.0 8.6 8.6 8.6    < > = values in this interval not displayed.     Recent Labs     01/14/21  2101 01/15/21  0100 01/15/21  0100 01/15/21  1959 01/16/21  0006 01/16/21  0441   ALTSGPT 30 23  --   --   --   --    ASTSGOT 18 11*  --   --   --   --    ALKPHOSPHAT 128* 97  --   --   --   --    TBILIRUBIN 0.4 0.3  --   --   --   --    LIPASE 32  --   --   --   --   --    GLUCOSE 580* 336*   < > 216* 143* 136*    < > = values in this interval not displayed.     Recent Labs     01/14/21  2101 01/14/21  2208 01/15/21  0100 01/16/21  0006   WBC  --  10.2 10.4 7.9   NEUTSPOLYS  --  84.20* 72.80*  --    LYMPHOCYTES  --  10.30* 18.70*  --    MONOCYTES  --  4.60 7.20  --    EOSINOPHILS  --  0.00 0.10  --    BASOPHILS  --  0.50 0.40  --    ASTSGOT 18  --  11*  --    ALTSGPT 30  --  23  --    ALKPHOSPHAT 128*  --  97  --    TBILIRUBIN 0.4  --  0.3  --      Recent Labs     01/14/21  2208 01/15/21  0100 01/16/21  0006   RBC 5.43 4.53* 4.01*   HEMOGLOBIN 17.3 14.7 12.9*   HEMATOCRIT 49.1 40.3* 35.7*   PLATELETCT 282 245 185     Imaging  X-Ray:  No film today    Assessment/Plan  * DKA (diabetic ketoacidoses) (HCC)  Assessment & Plan  No identified inciting event  Severe acidemia on admission is improving with insulin drip  Anion gap still elevated due to ketosis and lactic acidosis  Continue insulin drip/DKA protocol  Start dextrose IVF  Aggressively replete K, Mg, Phos and phos per protocol  Closely monitor on telemetry  NPO  A1c in  process  Diabetic education  SW referral to establish with PCP    Hypomagnesemia  Assessment & Plan  Due to DKA  Aggressively replete    Hypophosphatemia  Assessment & Plan  Due to DKA  Aggressively replete    Hypokalemia, ECF to ICF shifts  Assessment & Plan  Due to DKA  Aggressively replete     VTE:  Lovenox  Ulcer: Not Indicated  Lines: None    I have performed a physical exam and reviewed and updated ROS and Plan today (1/16/2021). In review of yesterday's note (1/15/2021), there are no changes except as documented above.     Discussed patient condition and risk of morbidity and/or mortality with RN, RT, Pharmacy and Patient     The patient remains critically ill.  Critical care time = 36 minutes in directly providing and coordinating critical care and extensive data review.  No time overlap and excludes procedures.    This note was generated using voice recognition software which has a chance of producing errors of grammar and content.  I have made every reasonable attempt to find and correct any errors, but it should be expected that some may not be found prior to finalization of this note.  __________  Jeovany Sage MD  Pulmonary and Critical Care Medicine  [facility]

## 2021-01-16 NOTE — PROGRESS NOTES
Pt assisted to ambulate to room 211-2 with personal belongings. Pt ate 100% of lunch, BG after lantus administration 152 prior to transfer to floor.

## 2021-01-17 VITALS
TEMPERATURE: 97.8 F | HEIGHT: 72 IN | SYSTOLIC BLOOD PRESSURE: 115 MMHG | OXYGEN SATURATION: 99 % | RESPIRATION RATE: 16 BRPM | DIASTOLIC BLOOD PRESSURE: 52 MMHG | HEART RATE: 67 BPM | BODY MASS INDEX: 23.29 KG/M2 | WEIGHT: 171.96 LBS

## 2021-01-17 LAB
ALBUMIN SERPL BCP-MCNC: 3.2 G/DL (ref 3.2–4.9)
ALBUMIN/GLOB SERPL: 1.3 G/DL
ALP SERPL-CCNC: 82 U/L (ref 30–99)
ALT SERPL-CCNC: 17 U/L (ref 2–50)
ANION GAP SERPL CALC-SCNC: 11 MMOL/L (ref 7–16)
AST SERPL-CCNC: 14 U/L (ref 12–45)
BASOPHILS # BLD AUTO: 0.4 % (ref 0–1.8)
BASOPHILS # BLD: 0.02 K/UL (ref 0–0.12)
BILIRUB SERPL-MCNC: 0.5 MG/DL (ref 0.1–1.5)
BUN SERPL-MCNC: 5 MG/DL (ref 8–22)
CALCIUM SERPL-MCNC: 8.5 MG/DL (ref 8.4–10.2)
CHLORIDE SERPL-SCNC: 105 MMOL/L (ref 96–112)
CO2 SERPL-SCNC: 19 MMOL/L (ref 20–33)
CREAT SERPL-MCNC: 0.48 MG/DL (ref 0.5–1.4)
EOSINOPHIL # BLD AUTO: 0.05 K/UL (ref 0–0.51)
EOSINOPHIL NFR BLD: 1 % (ref 0–6.9)
ERYTHROCYTE [DISTWIDTH] IN BLOOD BY AUTOMATED COUNT: 39.4 FL (ref 35.9–50)
EST. AVERAGE GLUCOSE BLD GHB EST-MCNC: 280 MG/DL
GLOBULIN SER CALC-MCNC: 2.5 G/DL (ref 1.9–3.5)
GLUCOSE BLD-MCNC: 223 MG/DL (ref 65–99)
GLUCOSE BLD-MCNC: 260 MG/DL (ref 65–99)
GLUCOSE SERPL-MCNC: 263 MG/DL (ref 65–99)
HBA1C MFR BLD: 11.4 % (ref 0–5.6)
HCT VFR BLD AUTO: 36.6 % (ref 42–52)
HGB BLD-MCNC: 13.4 G/DL (ref 14–18)
IMM GRANULOCYTES # BLD AUTO: 0.02 K/UL (ref 0–0.11)
IMM GRANULOCYTES NFR BLD AUTO: 0.4 % (ref 0–0.9)
LYMPHOCYTES # BLD AUTO: 1.79 K/UL (ref 1–4.8)
LYMPHOCYTES NFR BLD: 34.2 % (ref 22–41)
MAGNESIUM SERPL-MCNC: 1.9 MG/DL (ref 1.5–2.5)
MCH RBC QN AUTO: 31.6 PG (ref 27–33)
MCHC RBC AUTO-ENTMCNC: 36.6 G/DL (ref 33.7–35.3)
MCV RBC AUTO: 86.3 FL (ref 81.4–97.8)
MONOCYTES # BLD AUTO: 0.54 K/UL (ref 0–0.85)
MONOCYTES NFR BLD AUTO: 10.3 % (ref 0–13.4)
NEUTROPHILS # BLD AUTO: 2.82 K/UL (ref 1.82–7.42)
NEUTROPHILS NFR BLD: 53.7 % (ref 44–72)
NRBC # BLD AUTO: 0 K/UL
NRBC BLD-RTO: 0 /100 WBC
PHOSPHATE SERPL-MCNC: 3.4 MG/DL (ref 2.5–4.5)
PLATELET # BLD AUTO: 166 K/UL (ref 164–446)
PMV BLD AUTO: 10.2 FL (ref 9–12.9)
POTASSIUM SERPL-SCNC: 3.1 MMOL/L (ref 3.6–5.5)
PROT SERPL-MCNC: 5.7 G/DL (ref 6–8.2)
RBC # BLD AUTO: 4.24 M/UL (ref 4.7–6.1)
SODIUM SERPL-SCNC: 135 MMOL/L (ref 135–145)
WBC # BLD AUTO: 5.2 K/UL (ref 4.8–10.8)

## 2021-01-17 PROCEDURE — 83735 ASSAY OF MAGNESIUM: CPT

## 2021-01-17 PROCEDURE — 85025 COMPLETE CBC W/AUTO DIFF WBC: CPT

## 2021-01-17 PROCEDURE — A9270 NON-COVERED ITEM OR SERVICE: HCPCS | Performed by: FAMILY MEDICINE

## 2021-01-17 PROCEDURE — 99239 HOSP IP/OBS DSCHRG MGMT >30: CPT | Performed by: FAMILY MEDICINE

## 2021-01-17 PROCEDURE — 82962 GLUCOSE BLOOD TEST: CPT

## 2021-01-17 PROCEDURE — 84100 ASSAY OF PHOSPHORUS: CPT

## 2021-01-17 PROCEDURE — 700111 HCHG RX REV CODE 636 W/ 250 OVERRIDE (IP): Performed by: INTERNAL MEDICINE

## 2021-01-17 PROCEDURE — 700102 HCHG RX REV CODE 250 W/ 637 OVERRIDE(OP): Performed by: FAMILY MEDICINE

## 2021-01-17 PROCEDURE — 80053 COMPREHEN METABOLIC PANEL: CPT

## 2021-01-17 RX ORDER — POTASSIUM CHLORIDE 20 MEQ/1
40 TABLET, EXTENDED RELEASE ORAL ONCE
Status: COMPLETED | OUTPATIENT
Start: 2021-01-17 | End: 2021-01-17

## 2021-01-17 RX ORDER — INSULIN GLARGINE 100 [IU]/ML
12 INJECTION, SOLUTION SUBCUTANEOUS EVERY MORNING
Qty: 10 ML | Refills: 0 | Status: SHIPPED | OUTPATIENT
Start: 2021-01-18 | End: 2021-01-20 | Stop reason: SDUPTHER

## 2021-01-17 RX ORDER — POTASSIUM CHLORIDE 750 MG/1
40 TABLET, EXTENDED RELEASE ORAL DAILY
Qty: 4 TAB | Refills: 0 | Status: SHIPPED | OUTPATIENT
Start: 2021-01-17 | End: 2021-01-20

## 2021-01-17 RX ADMIN — INSULIN GLARGINE 12 UNITS: 100 INJECTION, SOLUTION SUBCUTANEOUS at 06:06

## 2021-01-17 RX ADMIN — ENOXAPARIN SODIUM 40 MG: 40 INJECTION SUBCUTANEOUS at 06:05

## 2021-01-17 RX ADMIN — POTASSIUM CHLORIDE 40 MEQ: 1500 TABLET, EXTENDED RELEASE ORAL at 08:58

## 2021-01-17 RX ADMIN — INSULIN LISPRO 5 UNITS: 100 INJECTION, SOLUTION INTRAVENOUS; SUBCUTANEOUS at 06:17

## 2021-01-17 RX ADMIN — INSULIN LISPRO 5 UNITS: 100 INJECTION, SOLUTION INTRAVENOUS; SUBCUTANEOUS at 06:18

## 2021-01-17 ASSESSMENT — ENCOUNTER SYMPTOMS
BLURRED VISION: 0
ORTHOPNEA: 0
NECK PAIN: 0
TINGLING: 0
DOUBLE VISION: 0
FEVER: 0
PHOTOPHOBIA: 0
BACK PAIN: 0
COUGH: 0
CHILLS: 0
NAUSEA: 0
HEADACHES: 0
HEARTBURN: 0
PALPITATIONS: 0
SPUTUM PRODUCTION: 0
DIZZINESS: 0
HEMOPTYSIS: 0
VOMITING: 0
MYALGIAS: 0

## 2021-01-17 ASSESSMENT — PAIN DESCRIPTION - PAIN TYPE: TYPE: ACUTE PAIN

## 2021-01-17 NOTE — PROGRESS NOTES
Received pt in bed with spouse at bedside.  Pt had questions re: d/c plan questions answered with opportunity for further questions.  Bed low and locked with personal items and call bell within reach.  Denies pain or discomfort, no s/sx of acute distress.  Pt demonstrated fingerstick with no difficulty, no insulin required with result.

## 2021-01-17 NOTE — PROGRESS NOTES
Patient discharged to home with relative escorted by hospital staff by wheelchair. PIVs removed. Prescriptions sent to [facility]. Discharge teaching completed at bedside and patient signature obtained. All questions and concerns addressed. Safety measures in place during transport.

## 2021-01-17 NOTE — PROGRESS NOTES
Assumed report and patient care from Linda VENEGAS. Patient is resting comfortably in bed with no signs of labored breathing or restlessness. POC discussed. No questions or concerns at this time. Safety measures in place, call light within reach.

## 2021-01-17 NOTE — DISCHARGE PLANNING
ER CM met with pt in room, see previous note. Lives with Spouse in [place] in 1 story home. Family members have diabetes. PCP new established this admission Dr Magaña. RX at [facility].  Indepent in ADLS and Ambulation. No DME or O2.   Care Transition Team Assessment    Information Source  Orientation : Oriented x 4  Information Given By: Patient  Informant's Name: Ho  Who is responsible for making decisions for patient? : Patient         Elopement Risk  Legal Hold: No  Ambulatory or Self Mobile in Wheelchair: Yes  Disoriented: No  Psychiatric Symptoms: None  History of Wandering: No  Elopement this Admit: No  Vocalizing Wanting to Leave: No  Displays Behaviors, Body Language Wanting to Leave: No-Not at Risk for Elopement  Elopement Risk: Not at Risk for Elopement    Interdisciplinary Discharge Planning  Primary Care Physician: Dr Magaña(Newly established this admission)  Lives with - Patient's Self Care Capacity: Spouse  Support Systems: Family Member(s), Spouse / Significant Other  Mobility Issues: No  Prior Services: None  Durable Medical Equipment: Not Applicable    Discharge Preparedness  What is your plan after discharge?: Uncertain - pending medical team collaboration         Finances  Prescription Coverage: Yes([facility])                   Domestic Abuse  Have you ever been the victim of abuse or violence?: No    Psychological Assessment  History of Substance Abuse: None         Anticipated Discharge Information  Discharge Disposition: Still a Patient (30)

## 2021-01-17 NOTE — PROGRESS NOTES
[facility] Medicine Daily Progress Note    Date of Service  1/17/2021    Chief Complaint  25 y.o. male admitted 1/14/2021 with  generalized weakness, nonbloody emesis    Hospital Course  25 y.o. male who presented 1/14/2021 with generalized weakness, nonbloody emesis.  He chews tobacco and follow-up the symptoms were attributable to nicotine withdrawal.  In the ER he was afebrile, tachycardic with a heart rate.  Labs are notable for an elevated glucose of 580, anion gap 30, bicarb 8, VBG pH 7.09.  He has a family history of type 1 diabetes in his brother but no personal history.    Interval Problem Update  none    Consultants/Specialty  Critical care  Diabetic educator    Code Status  Full Code    Disposition  pending    Review of Systems  Review of Systems   Constitutional: Negative for chills, fever and malaise/fatigue.   HENT: Negative for ear discharge, ear pain and tinnitus.    Eyes: Negative for blurred vision, double vision and photophobia.   Respiratory: Negative for cough, hemoptysis and sputum production.    Cardiovascular: Negative for chest pain, palpitations and orthopnea.   Gastrointestinal: Negative for heartburn, nausea and vomiting.   Genitourinary: Negative for dysuria, frequency and urgency.   Musculoskeletal: Negative for back pain, myalgias and neck pain.   Skin: Negative for itching and rash.   Neurological: Negative for dizziness, tingling and headaches.        Physical Exam  Temp:  [36.6 °C (97.8 °F)-36.8 °C (98.2 °F)] 36.6 °C (97.8 °F)  Pulse:  [57-78] 67  Resp:  [15-22] 16  BP: (100-115)/(52-78) 115/52  SpO2:  [94 %-99 %] 99 %    Physical Exam  Constitutional:       Appearance: Normal appearance.   HENT:      Head: Normocephalic and atraumatic.      Nose: Nose normal.      Mouth/Throat:      Mouth: Mucous membranes are dry.   Eyes:      Extraocular Movements: Extraocular movements intact.      Pupils: Pupils are equal, round, and reactive to light.   Neck:      Musculoskeletal: Normal range  of motion and neck supple.   Cardiovascular:      Rate and Rhythm: Normal rate and regular rhythm.      Pulses: Normal pulses.      Heart sounds: Normal heart sounds.   Pulmonary:      Effort: Pulmonary effort is normal.      Breath sounds: Normal breath sounds.   Abdominal:      General: Abdomen is flat.      Palpations: Abdomen is soft.   Musculoskeletal: Normal range of motion.   Skin:     General: Skin is warm and dry.   Neurological:      General: No focal deficit present.      Mental Status: He is alert and oriented to person, place, and time.         Fluids    Intake/Output Summary (Last 24 hours) at 1/17/2021 0815  Last data filed at 1/17/2021 0446  Gross per 24 hour   Intake 1812.58 ml   Output --   Net 1812.58 ml       Laboratory  Recent Labs     01/15/21  0100 01/16/21  0006 01/17/21  0439   WBC 10.4 7.9 5.2   RBC 4.53* 4.01* 4.24*   HEMOGLOBIN 14.7 12.9* 13.4*   HEMATOCRIT 40.3* 35.7* 36.6*   MCV 89.0 89.0 86.3   MCH 32.5 32.2 31.6   MCHC 36.5* 36.1* 36.6*   RDW 40.0 40.8 39.4   PLATELETCT 245 185 166   MPV 9.8 9.7 10.2     Recent Labs     01/16/21  0441 01/16/21  1145 01/17/21  0439   SODIUM 133* 135 135   POTASSIUM 2.8* 3.4* 3.1*   CHLORIDE 107 108 105   CO2 17* 16* 19*   GLUCOSE 136* 115* 263*   BUN 8 6* 5*   CREATININE 0.57 0.47* 0.48*   CALCIUM 8.6 8.5 8.5                   Imaging       Assessment/Plan  * DKA (diabetic ketoacidoses) ([facility])  Assessment & Plan  Has resolved  His hema1c is 11.4  On s.s.insulin  On lantus  Diabetic education consult  accu check results are noted  Probable dc home on 1/18/21    Hypokalemia, ECF to I[facility] shifts  Assessment & Plan  kcl         VTE prophylaxis: lovenox

## 2021-01-17 NOTE — DISCHARGE PLANNING
"Anticipated Discharge Disposition: Home when cleared and DC needs met    Action: Voalte from KARY Velasco for DC\" resources for Diabetes\"( which addresses where to obtain supplies etc.) Floor SW Mona riggs. ER KARAN obtained resource handout. Provided to RN Misty for pt.  MD note \"His father called and stated that pt and the family prefer that he goes home and they have long history of diabetes in their family and can handle it at home.dietitian saw the pt yesterday and he will be discharged home with lantus and s.s.insulin and needs to see his pcp next week and be started on metformin as well.\" RN notes that dietician saw him yesterday and got rough estimates of claories but no packet info or other education. Voalte to Rigo in Nutrition Services. He suggested Document library. Voalte to Dietician at [facility] Lien SCHREIBER as none seen on Voalte here at [facility] today to see if can assist. SONJA RUSSO does not see Diabetic educator note.    Barriers to Discharge: DC education clarification    Plan: Try to obtain further info if able for pt and RN. Suggest RN provide [employer] site Diabetes page in [place] [email or web address] and Number for Diabetic educator office for appt in . Susan may be helpful. [facility] Dietician will try to email info if able. RN aware. SONJA RUSSO printed info from [employer] and Provided Diabetes Carb counting for people with diabetes,Carbs in Alcohol, Choose your foods exchange list  Diabetes label reading and using nutirition labesl carbohydrates to RN  "

## 2021-01-17 NOTE — DISCHARGE INSTRUCTIONS
Discharge Instructions    Discharged to home by car with relative. Discharged via wheelchair, hospital escort: Yes.  Special equipment needed: Not Applicable    Be sure to schedule a follow-up appointment with your primary care doctor or any specialists as instructed.     Discharge Plan:   Diet Plan: (P) Discussed  Activity Level: (P) Discussed  Confirmed Follow up Appointment: (P) Patient to Call and Schedule Appointment  Confirmed Symptoms Management: (P) Discussed  Medication Reconciliation Updated: (P) Yes  Influenza Vaccine Indication: Patient Refuses    I understand that a diet low in cholesterol, fat, and sodium is recommended for good health. Unless I have been given specific instructions below for another diet, I accept this instruction as my diet prescription.   Other diet: diabetic diet outlined in paperwork and to be reiterated during diabetic educator appointment outpatient.     Special Instructions: None    · Is patient discharged on Warfarin / Coumadin?   No     Depression / Suicide Risk    As you are discharged from this [facility] facility, it is important to learn how to keep safe from harming yourself.    Recognize the warning signs:  · Abrupt changes in personality, positive or negative- including increase in energy   · Giving away possessions  · Change in eating patterns- significant weight changes-  positive or negative  · Change in sleeping patterns- unable to sleep or sleeping all the time   · Unwillingness or inability to communicate  · Depression  · Unusual sadness, discouragement and loneliness  · Talk of wanting to die  · Neglect of personal appearance   · Rebelliousness- reckless behavior  · Withdrawal from people/activities they love  · Confusion- inability to concentrate     If you or a loved one observes any of these behaviors or has concerns about self-harm, here's what you can do:  · Talk about it- your feelings and reasons for harming yourself  · Remove any means that you might  use to hurt yourself (examples: pills, rope, extension cords, firearm)  · Get professional help from the community (Mental Health, Substance Abuse, psychological counseling)  · Do not be alone:Call your Safe Contact- someone whom you trust who will be there for you.  · Call your local CRISIS HOTLINE 100-9761 or 151-302-2549  · Call your local Children's Mobile Crisis Response Team [place] (260) 423-3648 or wwwForwardMetrics  · Call the toll free National Suicide Prevention Hotlines   · National Suicide Prevention Lifeline 232-873-TCSR (8971)  · National Hope Line Network 800-SUICIDE (596-9210)      Type 1 Diabetes Mellitus, Diagnosis, Adult    Type 1 diabetes (type 1 diabetes mellitus) is a long-term (chronic) disease. It happens when your pancreas does not make enough of a hormone called insulin. Insulin lets sugars (glucose) go into cells in your body. This gives you energy. If your body does not make enough insulin, sugars cannot get into cells. This causes high blood sugar (hyperglycemia).  Your doctor will set treatment goals for you. Generally, you should have these blood sugar levels:  · Before meals (preprandial):  mg/dL (4.4-7.2 mmol/L).  · After meals (postprandial): below 180 mg/dL (10 mmol/L).  · A1c (hemoglobin A1c) level: less than 7%.  Follow these instructions at home:  Questions to ask your doctor  · You may want to ask these questions:  ? Do I need to meet with a diabetes educator?  ? Where can I find a support group for people with diabetes?  ? What equipment will I need to care for myself at home?  ? What diabetes medicines do I need? When should I take them?  ? How often do I need to check my blood sugar?  ? What number can I call if I have questions?  ? When is my next doctor's visit?  General instructions  · Take over-the-counter and prescription medicines only as told by your doctor.  · Keep all follow-up visits as told by your doctor. This is important.  Contact a doctor  if:  · Your blood sugar is at or above 240 mg/dL (13.3 mmol/L) for 2 days in a row.  · You have been sick for 2 days or more, and you are not getting better.  · You have had a fever for 2 days or more, and you are not getting better.  · You have any of these problems for more than 6 hours:  ? You cannot eat or drink.  ? You feel sick to your stomach (nauseous).  ? You throw up (vomit).  ? You have watery poop (diarrhea).  Get help right away if:  · Your blood sugar is lower than 54 mg/dL (3 mmol/L).  · You get confused.  · You have trouble:  ? Thinking clearly.  ? Breathing.  · You have moderate or large ketone levels in your pee (urine).  Summary  · Type 1 diabetes (type 1 diabetes mellitus) is a long-term disease. It happens when your pancreas does not make enough of a hormone called insulin.  · Your doctor will set treatment goals for you.  · Take over-the-counter and prescription medicines only as told by your doctor.  · Keep all follow-up visits as told by your doctor. This is important.  This information is not intended to replace advice given to you by your health care provider. Make sure you discuss any questions you have with your health care provider.  Document Released: 04/10/2017 Document Revised: 11/30/2018 Document Reviewed: 01/20/2017  [employer] Patient Education © 2020 [employer]    Insulin Glargine injection  What is this medicine?  INSULIN GLARGINE (IN wade sanya GLAR geen) is a human-made form of insulin. This drug lowers the amount of sugar in your blood. It is a long-acting insulin that is usually given once a day.  This medicine may be used for other purposes; ask your health care provider or pharmacist if you have questions.  COMMON BRAND NAME(S): BASAGLAR, Lantus, Lantus SoloStar, Toujeo Max SoloStar, Toujeo SoloStar  What should I tell my health care provider before I take this medicine?  They need to know if you have any of these conditions:  · episodes of low blood sugar  · eye disease,  vision problems  · kidney disease  · liver disease  · an unusual or allergic reaction to insulin, metacresol, other medicines, foods, dyes, or preservatives  · pregnant or trying to get pregnant  · breast-feeding  How should I use this medicine?  This medicine is for injection under the skin. Use this medicine at the same time each day. Use exactly as directed. This insulin should never be mixed in the same syringe with other insulins before injection. Do not vigorously shake before use. You will be taught how to use this medicine and how to adjust doses for activities and illness. Do not use more insulin than prescribed.  Always check the appearance of your insulin before using it. This medicine should be clear and colorless like water. Do not use it if it is cloudy, thickened, colored, or has solid particles in it.  If you use an insulin pen, be sure to take off the outer needle cover before using the dose.  It is important that you put your used needles and syringes in a special sharps container. Do not put them in a trash can. If you do not have a sharps container, call your pharmacist or healthcare provider to get one.  Talk to your pediatrician regarding the use of this medicine in children. While this drug may be prescribed for children as young as 6 years for selected conditions, precautions do apply.  Overdosage: If you think you have taken too much of this medicine contact a poison control center or emergency room at once.  NOTE: This medicine is only for you. Do not share this medicine with others.  What if I miss a dose?  It is important not to miss a dose. Your health care professional or doctor should discuss a plan for missed doses with you. If you do miss a dose, follow their plan. Do not take double doses.  What may interact with this medicine?  · other medicines for diabetes  Many medications may cause changes in blood sugar, these include:  · alcohol containing beverages  · antiviral medicines for  HIV or AIDS  · aspirin and aspirin-like drugs  · certain medicines for blood pressure, heart disease, irregular heart beat  · chromium  · diuretics  · female hormones, such as estrogens or progestins, birth control pills  · fenofibrate  · gemfibrozil  · isoniazid  · lanreotide  · male hormones or anabolic steroids  · MAOIs like Carbex, Eldepryl, Marplan, Nardil, and Parnate  · medicines for weight loss  · medicines for allergies, asthma, cold, or cough  · medicines for depression, anxiety, or psychotic disturbances  · niacin  · nicotine  · NSAIDs, medicines for pain and inflammation, like ibuprofen or naproxen  · octreotide  · pasireotide  · pentamidine  · phenytoin  · probenecid  · quinolone antibiotics such as ciprofloxacin, levofloxacin, ofloxacin  · some herbal dietary supplements  · steroid medicines such as prednisone or cortisone  · sulfamethoxazole; trimethoprim  · thyroid hormones  Some medications can hide the warning symptoms of low blood sugar (hypoglycemia). You may need to monitor your blood sugar more closely if you are taking one of these medications. These include:  · beta-blockers, often used for high blood pressure or heart problems (examples include atenolol, metoprolol, propranolol)  · clonidine  · guanethidine  · reserpine  This list may not describe all possible interactions. Give your health care provider a list of all the medicines, herbs, non-prescription drugs, or dietary supplements you use. Also tell them if you smoke, drink alcohol, or use illegal drugs. Some items may interact with your medicine.  What should I watch for while using this medicine?  Visit your health care professional or doctor for regular checks on your progress.  Do not drive, use machinery, or do anything that needs mental alertness until you know how this medicine affects you. Alcohol may interfere with the effect of this medicine. Avoid alcoholic drinks.  A test called the HbA1C (A1C) will be monitored. This is a  simple blood test. It measures your blood sugar control over the last 2 to 3 months. You will receive this test every 3 to 6 months.  Learn how to check your blood sugar. Learn the symptoms of low and high blood sugar and how to manage them.  Always carry a quick-source of sugar with you in case you have symptoms of low blood sugar. Examples include hard sugar candy or glucose tablets. Make sure others know that you can choke if you eat or drink when you develop serious symptoms of low blood sugar, such as seizures or unconsciousness. They must get medical help at once.  Tell your doctor or health care professional if you have high blood sugar. You might need to change the dose of your medicine. If you are sick or exercising more than usual, you might need to change the dose of your medicine.  Do not skip meals. Ask your doctor or health care professional if you should avoid alcohol. Many nonprescription cough and cold products contain sugar or alcohol. These can affect blood sugar.  Make sure that you have the right kind of syringe for the type of insulin you use. Try not to change the brand and type of insulin or syringe unless your health care professional or doctor tells you to. Switching insulin brand or type can cause dangerously high or low blood sugar. Always keep an extra supply of insulin, syringes, and needles on hand. Use a syringe one time only. Throw away syringe and needle in a closed container to prevent accidental needle sticks.  Insulin pens and cartridges should never be shared. Even if the needle is changed, sharing may result in passing of viruses like hepatitis or HIV.  Each time you get a new box of pen needles, check to see if they are the same type as the ones you were trained to use. If not, ask your health care professional to show you how to use this new type properly.  Wear a medical ID bracelet or chain, and carry a card that describes your disease and details of your medicine and  dosage times.  What side effects may I notice from receiving this medicine?  Side effects that you should report to your doctor or health care professional as soon as possible:  · allergic reactions like skin rash, itching or hives, swelling of the face, lips, or tongue  · breathing problems  · signs and symptoms of high blood sugar such as dizziness, dry mouth, dry skin, fruity breath, nausea, stomach pain, increased hunger or thirst, increased urination  · signs and symptoms of low blood sugar such as feeling anxious, confusion, dizziness, increased hunger, unusually weak or tired, sweating, shakiness, cold, irritable, headache, blurred vision, fast heartbeat, loss of consciousness  Side effects that usually do not require medical attention (report to your doctor or health care professional if they continue or are bothersome):  · increase or decrease in fatty tissue under the skin due to overuse of a particular injection site  · itching, burning, swelling, or rash at site where injected  This list may not describe all possible side effects. Call your doctor for medical advice about side effects. You may report side effects to FDA at 3-464-FDA-7628.  Where should I keep my medicine?  Keep out of the reach of children.  Unopened Vials:  Lantus vials: Store in a refrigerator between 2 and 8 degrees C (36 and 46 degrees F) or at room temperature below 30 degrees C (86 degrees F). Do not freeze or use if the insulin has been frozen. Protect from light and excessive heat. If stored at room temperature, the vial must be discarded after 28 days. Throw away any unopened and unused medicine that has been stored in the refrigerator after the expiration date.  Unopened Pens:  Basaglar KwikPens: Store in a refrigerator between 2 and 8 degrees C (36 and 46 degrees F) or at room temperature below 30 degrees C (86 degrees F). Do not freeze or use if the insulin has been frozen. Protect from light and excessive heat. If stored at  room temperature, the pen must be discarded after 28 days. Throw away any unopened and unused medicine that has been stored in the refrigerator after the expiration date.  Lantus Solostar Pens: Store in a refrigerator between 2 and 8 degrees C (36 and 46 degrees F) or at room temperature below 30 degrees C (86 degrees F). Do not freeze or use if the insulin has been frozen. Protect from light and excessive heat. If stored at room temperature, the pen must be discarded after 28 days. Throw away any unopened and unused medicine that has been stored in the refrigerator after the expiration date.  Toujeo Solostar Pens or Toujeo Max Solostar Pens: Store in a refrigerator between 2 and 8 degrees C (36 and 46 degrees F). Do not freeze or use if the insulin has been frozen. Protect from light and excessive heat. Throw away any unopened and unused medicine that has been stored in the refrigerator after the expiration date.  Vials that you are using:  Lantus vials: Store in a refrigerator or at room temperature below 30 degrees C (86 degrees F). Do not freeze. Keep away from heat and light. Throw the opened vial away after 28 days.  Pens that you are using:  Basaglar KwikPens: Store at room temperature below 30 degrees C (86 degrees F). Do not refrigerate or freeze. Keep away from heat and light. Throw the pen away after 28 days, even if it still has insulin left in it.  Lantus Solostar Pens: Store at room temperature below 30 degrees C (86 degrees F). Do not refrigerate or freeze. Keep away from heat and light. Throw the pen away after 28 days, even if it still has insulin left in it.  Toujeo Solostar Pens or Toujeo Max Solostar Pens: Store at room temperature below 30 degrees C (86 degrees F). Do not refrigerate or freeze. Keep away from heat and light. Throw the pen away after 56 days, even if it still has insulin left in it.  NOTE: This sheet is a summary. It may not cover all possible information. If you have questions  about this medicine, talk to your doctor, pharmacist, or health care provider.  © 2020 [employer] (2020-03-24 12:28:36)    Insulin Lispro injection  What is this medicine?  INSULIN LISPRO (IN mauro villegas) is a human-made form of insulin. This drug lowers the amount of sugar in your blood. This medicine is a rapid-acting insulin that starts working faster than regular insulin. It will not work as long as regular insulin.  This medicine may be used for other purposes; ask your health care provider or pharmacist if you have questions.  COMMON BRAND NAME(S): Admelog, Admelog SoloStar, Humalog, Humalog Ar KwikPen, Humalog KwikPen  What should I tell my health care provider before I take this medicine?  They need to know if you have any of these conditions:  · episodes of low blood sugar  · eye disease, vision problems  · kidney disease  · liver disease  · an unusual or allergic reaction to insulin, metacresol, other medicines, foods, dyes, or preservatives  · pregnant or trying to get pregnant  · breast-feeding  How should I use this medicine?  This medicine is for injection under the skin or infusion into a vein. This medicine may be given by health care professional in a hospital or clinic setting. It is important to follow the directions given to you by your health care professional or doctor. You should inject this medicine within 15 minutes before or after your meal. Have food ready before injection. Do not delay eating. You will be taught how to use this medicine and how to adjust doses for activities and illness. Do not use more insulin than prescribed. Do not use more or less often than prescribed.  Always check the appearance of your insulin before using it. This medicine should be clear and colorless like water. Do not use it if it is cloudy, thickened, colored, or has solid particles in it. If you use a pen, be sure to take off the outer needle cover before using the dose. It is important  that you put your used needles and syringes in a special sharps container. Do not put them in a trash can. If you do not have a sharps container, call your pharmacist or healthcare provider to get one.  Talk to your pediatrician regarding the use of this medicine in children. Special care may be needed.  Overdosage: If you think you have taken too much of this medicine contact a poison control center or emergency room at once.  NOTE: This medicine is only for you. Do not share this medicine with others.  What if I miss a dose?  It is important not to miss a dose. Your health care professional or doctor should discuss a plan for missed doses with you. If you do miss a dose, follow their plan. Do not take double doses.  What may interact with this medicine?  · other medicines for diabetes  Many medications may cause changes in blood sugar, these include:  · alcohol containing beverages  · antiviral medicines for HIV or AIDS  · aspirin and aspirin-like drugs  · certain medicines for blood pressure, heart disease, irregular heart beat  · chromium  · diuretics  · female hormones, such as estrogens or progestins, birth control pills  · fenofibrate  · gemfibrozil  · isoniazid  · lanreotide  · male hormones or anabolic steroids  · MAOIs like Carbex, Eldepryl, Marplan, Nardil, and Parnate  · medicines for weight loss  · medicines for allergies, asthma, cold, or cough  · medicines for depression, anxiety, or psychotic disturbances  · niacin  · nicotine  · NSAIDs, medicines for pain and inflammation, like ibuprofen or naproxen  · octreotide  · pasireotide  · pentamidine  · phenytoin  · probenecid  · quinolone antibiotics such as ciprofloxacin, levofloxacin, ofloxacin  · some herbal dietary supplements  · steroid medicines such as prednisone or cortisone  · sulfamethoxazole; trimethoprim  · thyroid hormones  Some medications can hide the warning symptoms of low blood sugar (hypoglycemia). You may need to monitor your blood  sugar more closely if you are taking one of these medications. These include:  · beta-blockers, often used for high blood pressure or heart problems (examples include atenolol, metoprolol, propranolol)  · clonidine  · guanethidine  · reserpine  This list may not describe all possible interactions. Give your health care provider a list of all the medicines, herbs, non-prescription drugs, or dietary supplements you use. Also tell them if you smoke, drink alcohol, or use illegal drugs. Some items may interact with your medicine.  What should I watch for while using this medicine?  Visit your health care professional or doctor for regular checks on your progress.  A test called the HbA1C (A1C) will be monitored. This is a simple blood test. It measures your blood sugar control over the last 2 to 3 months. You will receive this test every 3 to 6 months.  Learn how to check your blood sugar. Learn the symptoms of low and high blood sugar and how to manage them.  Always carry a quick-source of sugar with you in case you have symptoms of low blood sugar. Examples include hard sugar candy or glucose tablets. Make sure others know that you can choke if you eat or drink when you develop serious symptoms of low blood sugar, such as seizures or unconsciousness. They must get medical help at once.  Tell your doctor or health care professional if you have high blood sugar. You might need to change the dose of your medicine. If you are sick or exercising more than usual, you might need to change the dose of your medicine.  Do not skip meals. Ask your doctor or health care professional if you should avoid alcohol. Many nonprescription cough and cold products contain sugar or alcohol. These can affect blood sugar.  Make sure that you have the right kind of syringe for the type of insulin you use. Try not to change the brand and type of insulin or syringe unless your health care professional or doctor tells you to. Switching insulin  brand or type can cause dangerously high or low blood sugar. Always keep an extra supply of insulin, syringes, and needles on hand. Use a syringe one time only. Throw away syringe and needle in a closed container to prevent accidental needle sticks.  Insulin pens and cartridges should never be shared. Even if the needle is changed, sharing may result in passing of viruses like hepatitis or HIV.  Each time you get a new box of pen needles, check to see if they are the same type as the ones you were trained to use. If not, ask your health care professional to show you how to use this new type properly.  Wear a medical ID bracelet or chain, and carry a card that describes your disease and details of your medicine and dosage times.  What side effects may I notice from receiving this medicine?  Side effects that you should report to your doctor or health care professional as soon as possible:  · allergic reactions like skin rash, itching or hives, swelling of the face, lips, or tongue  · breathing problems  · signs and symptoms of high blood sugar such as dizziness, dry mouth, dry skin, fruity breath, nausea, stomach pain, increased hunger or thirst, increased urination  · signs and symptoms of low blood sugar such as feeling anxious, confusion, dizziness, increased hunger, unusually weak or tired, sweating, shakiness, cold, irritable, headache, blurred vision, fast heartbeat, loss of consciousness  Side effects that usually do not require medical attention (report to your doctor or health care professional if they continue or are bothersome):  · increase or decrease in fatty tissue under the skin due to overuse of a particular injection site  · itching, burning, swelling, or rash at site where injected  This list may not describe all possible side effects. Call your doctor for medical advice about side effects. You may report side effects to FDA at 0-502-FDA-4960.  Where should I keep my medicine?  Keep out of the reach  of children.  Unopened Vials:  Admelog Vials: Store in a refrigerator between 2 and 8 degrees C (36 and 46 degrees F) or at room temperature below 30 degrees C (86 degrees F). Do not freeze or use if the insulin has been frozen. Protect from light and excessive heat. If stored at room temperature, the vial must be discarded after 28 days. Throw away any unopened and unused medicine that has been stored in the refrigerator after the expiration date.  Humalog Vials: Store in a refrigerator between 2 and 8 degrees C (36 and 46 degrees F) or at room temperature below 30 degrees C (86 degrees F). Do not freeze or use if the insulin has been frozen. Protect from light and excessive heat. If stored at room temperature, the vial must be discarded after 28 days. Throw away any unopened and unused medicine that has been stored in the refrigerator after the expiration date.  Insulin lispro Vials: Store in a refrigerator between 2 and 8 degrees C (36 and 46 degrees F) or at room temperature below 30 degrees C (86 degrees F). Do not freeze or use if the insulin has been frozen. Protect from light and excessive heat. If stored at room temperature, the vial must be discarded after 28 days. Throw away any unopened and unused medicine that has been stored in the refrigerator after the expiration date.  Unopened Pens:  Admelog Solostar Pens: Store in a refrigerator between 2 and 8 degrees C (36 and 46 degrees F) or at room temperature below 30 degrees C (86 degrees F). Do not freeze or use if the insulin has been frozen. Protect from light and excessive heat. If stored at room temperature, the pen must be discarded after 28 days. Throw away any unopened and unused medicine that has been stored in the refrigerator after the expiration date.  Humalog KwikPens or Humalog Ar KwikPens: Store in a refrigerator between 2 and 8 degrees C (36 and 46 degrees F) or at room temperature below 30 degrees C (86 degrees F). Do not freeze or use  if the insulin has been frozen. Protect from light and excessive heat. If stored at room temperature, the pen must be discarded after 28 days. Throw away any unopened and unused medicine that has been stored in the refrigerator after the expiration date.  Insulin Lispro KwikPens: Store in a refrigerator between 2 and 8 degrees C (36 and 46 degrees F) or at room temperature below 30 degrees C (86 degrees F). Do not freeze or use if the insulin has been frozen. Protect from light and excessive heat. If stored at room temperature, the pen must be discarded after 28 days. Throw away any unopened and unused medicine that has been stored in the refrigerator after the expiration date.  Unopened Cartridges:  Humalog Cartridges: Store in a refrigerator between 2 and 8 degrees C (36 and 46 degrees F) or at room temperature below 30 degrees C (86 degrees F). Do not freeze or use if the insulin has been frozen. Protect from light and excessive heat. If stored at room temperature, the cartridge must be discarded after 28 days. Throw away any unopened and unused medicine that has been stored in the refrigerator after the expiration date.  Vials that you are using:  Admelog Vials: Store in a refrigerator or at room temperature below 30 degrees C (86 degrees F). Do not freeze. Protect from light and heat. Throw the opened vial away after 28 days.  Humalog Vials: Store in a refrigerator or at room temperature below 30 degrees C (86 degrees F). Do not freeze. Protect from light and heat. Throw the opened vial away after 28 days.  Insulin lispro Vials: Store in a refrigerator or at room temperature below 30 degrees C (86 degrees F). Do not freeze. Protect from light and heat. Throw the opened vial away after 28 days.  Pens that you are using:  Admelog Solostar Pens: Store at room temperature below 30 degrees C (86 degrees F). Do not refrigerate or freeze. Keep away from heat and light. Throw the pen away after 28 days, even if it  still has insulin left in it.  Humalog KwikPens and Humalog Ar KwikPens: Store at room temperature below 30 degrees C (86 degrees F). Do not refrigerate or freeze. Keep away from heat and light. Throw the pen away after 28 days, even if it still has insulin left in it.  Insulin Lispro KwikPens: Store at room temperature below 30 degrees C (86 degrees F). Do not refrigerate or freeze. Keep away from heat and light. Throw the pen away after 28 days, even if it still has insulin left in it.  Cartridges that you are using:  Humalog Cartridges: Store at room temperature below 30 degrees C (86 degrees F). Do not refrigerate or freeze. Keep away from heat and light. Throw the cartridge away after 28 days, even if it still has insulin left in it.  NOTE: This sheet is a summary. It may not cover all possible information. If you have questions about this medicine, talk to your doctor, pharmacist, or health care provider.  © 2020 Elsevier/Gold [employer] (2020-03-27 11:29:45)

## 2021-01-17 NOTE — PROGRESS NOTES
Reached out to charge RUBI Terry about diabetes education materials for DC. Pt states yesterday a dietician came by and gave an estimate of calorie count. Diabetes educator to be seen outpatient. Charge RN contacted Rod PRESTON about diabetic resources and connected us to RUBI Méndez Case Manager. Honey came up to the floor to give me a resource handout.     1030: Reached back out to Honey to get patient any more diabetic education materials for discharge since no diabetic educator or dietician is available today. Awaiting response at this time.

## 2021-01-17 NOTE — DISCHARGE SUMMARY
Discharge Summary    CHIEF COMPLAINT ON ADMISSION  Chief Complaint   Patient presents with   • N/V   • Constipation       Reason for Admission  Weightloss; Sore throat; N/V     Admission Date  1/14/2021    CODE STATUS  Full Code    HPI & HOSPITAL COURSE  This is a 25 y.o. male here with  no significant past medical history who reports quitting chewing tobacco a few days ago and who presented to the emergency department on 1/14/2021 with complains of generalized weakness, 7 episodes of nonbloody emesis, fatigue.  He initially attributed his symptoms to nicotine withdrawal but since it was not improving he decided to come to the emergency department for further evaluation.  Patient denies having fever, cough, chest pain, loss of smell or taste or exposures to COVID-19.  He was diagnosed with DKA and was admitted to icu and was placed on iv fluid and iv insulin drip as per protocol.his DKA has resolved and he was transferred out of the icu and he is doing quiet well.his hea1c is 11.3.initially pt was going to stay and  His father called and stated that pt and the family prefer that he goes home and they have long history of diabetes in their family and can handle it at home.dietitian saw the pt yesterday and he will be discharged home with lantus and s.s.insulin and needs to see his pcp next week and be started on metformin as well.  His k level is low and will replace thay for the next 4 days.  He also needs to see [facility] as an outpt.      Therefore, he is discharged in good and stable condition to home with close outpatient follow-up.    The patient met 2-midnight criteria for an inpatient stay at the time of discharge.    Discharge Date  1/17/21    FOLLOW UP ITEMS POST DISCHARGE  pcp next week    DISCHARGE DIAGNOSES  Principal Problem:    DKA (diabetic ketoacidoses) (HCC) POA: Unknown  Active Problems:    Hypokalemia, ECF to ICF shifts POA: Unknown    Hypophosphatemia POA: Unknown    Hypomagnesemia POA:  Unknown  Resolved Problems:    * No resolved hospital problems. *      FOLLOW UP  Future Appointments   Date Time Provider Department Center   1/20/2021  8:00 AM [facility]     No follow-up provider specified.    MEDICATIONS ON DISCHARGE     Medication List      START taking these medications      Instructions   insulin glargine 100 UNIT/ML Soln  Start taking on: January 18, 2021  Commonly known as: Lantus   Inject 12 Units under the skin every morning.  Dose: 12 Units     * insulin lispro 100 UNIT/ML  Commonly known as: HumaLOG   Inject 5 Units under the skin 3 times a day before meals.  Dose: 5 Units     * insulin lispro 100 UNIT/ML  Commonly known as: HumaLOG   Inject 2-9 Units under the skin 4 Times a Day,Before Meals and at Bedtime.  Dose: 2-9 Units     potassium chloride SA 10 MEQ Tbcr  Commonly known as: K-DUR   Take 4 Tabs by mouth every day.  Dose: 40 mEq         * This list has 2 medication(s) that are the same as other medications prescribed for you. Read the directions carefully, and ask your doctor or other care provider to review them with you.            CONTINUE taking these medications      Instructions   docusate sodium 100 MG Caps  Commonly known as: COLACE   Take 100 mg by mouth 2 times a day.  Dose: 100 mg     IBUPROFEN PO   Take 600 mg by mouth.  Dose: 600 mg     PROBIOTIC-10 PO   Take  by mouth.     ZyrTEC 10 MG Tabs  Generic drug: cetirizine   Take  by mouth every day.            Allergies  No Known Allergies    DIET  Orders Placed This Encounter   Procedures   • Diet Order Diet: Consistent CHO (Diabetic)     Standing Status:   Standing     Number of Occurrences:   1     Order Specific Question:   Diet:     Answer:   Consistent CHO (Diabetic) [4]       ACTIVITY  As tolerated.  Weight bearing as tolerated    CONSULTATIONS  Critical care    PROCEDURES  none    LABORATORY  Lab Results   Component Value Date    SODIUM 135 01/17/2021    POTASSIUM 3.1 (L) 01/17/2021     CHLORIDE 105 01/17/2021    CO2 19 (L) 01/17/2021    GLUCOSE 263 (H) 01/17/2021    BUN 5 (L) 01/17/2021    CREATININE 0.48 (L) 01/17/2021        Lab Results   Component Value Date    WBC 5.2 01/17/2021    HEMOGLOBIN 13.4 (L) 01/17/2021    HEMATOCRIT 36.6 (L) 01/17/2021    PLATELETCT 166 01/17/2021        Total time of the discharge process exceeds 35 minutes.

## 2021-01-18 DIAGNOSIS — E10.10 DIABETIC KETOACIDOSIS WITHOUT COMA ASSOCIATED WITH TYPE 1 DIABETES MELLITUS (HCC): ICD-10-CM

## 2021-01-19 LAB — EKG IMPRESSION: NORMAL

## 2021-01-19 PROCEDURE — 93010 ELECTROCARDIOGRAM REPORT: CPT | Performed by: INTERNAL MEDICINE

## 2021-01-19 NOTE — ED NOTES
Fanny from Lab called with critical result of Glucose 580 and CO2 8 at 2110. Critical lab result read back to Fanny.   Dr. Buitrago notified of critical lab result at 2120.  Critical lab result read back by Dr. Buitrago.    PER Dr Buitargo start a liter of LR while pt is waiting to be seen.    [id number] Comprehensive

## 2021-01-20 ENCOUNTER — OFFICE VISIT (OUTPATIENT)
Dept: MEDICAL GROUP | Facility: PHYSICIAN GROUP | Age: 26
End: 2021-01-20
Payer: COMMERCIAL

## 2021-01-20 VITALS
BODY MASS INDEX: 26.01 KG/M2 | TEMPERATURE: 98.8 F | SYSTOLIC BLOOD PRESSURE: 108 MMHG | DIASTOLIC BLOOD PRESSURE: 62 MMHG | HEIGHT: 72 IN | WEIGHT: 192 LBS | OXYGEN SATURATION: 99 % | RESPIRATION RATE: 14 BRPM | HEART RATE: 71 BPM

## 2021-01-20 DIAGNOSIS — Z00.00 WELL ADULT EXAM: ICD-10-CM

## 2021-01-20 DIAGNOSIS — E10.9 TYPE 1 DIABETES MELLITUS WITHOUT COMPLICATION (HCC): ICD-10-CM

## 2021-01-20 PROBLEM — E11.10 DKA (DIABETIC KETOACIDOSES): Status: RESOLVED | Noted: 2021-01-15 | Resolved: 2021-01-20

## 2021-01-20 PROBLEM — E83.42 HYPOMAGNESEMIA: Status: RESOLVED | Noted: 2021-01-16 | Resolved: 2021-01-20

## 2021-01-20 PROCEDURE — 99204 OFFICE O/P NEW MOD 45 MIN: CPT | Performed by: NURSE PRACTITIONER

## 2021-01-20 RX ORDER — BLOOD-GLUCOSE METER
KIT MISCELLANEOUS
COMMUNITY
Start: 2021-01-17

## 2021-01-20 RX ORDER — PEN NEEDLE, DIABETIC 32GX 5/32"
NEEDLE, DISPOSABLE MISCELLANEOUS
COMMUNITY
Start: 2021-01-17

## 2021-01-20 RX ORDER — INSULIN LISPRO 100 [IU]/ML
INJECTION, SOLUTION INTRAVENOUS; SUBCUTANEOUS
COMMUNITY
Start: 2021-01-17 | End: 2021-01-19

## 2021-01-20 RX ORDER — INSULIN GLARGINE 100 [IU]/ML
INJECTION, SOLUTION SUBCUTANEOUS
COMMUNITY
Start: 2021-01-17 | End: 2021-01-19

## 2021-01-20 RX ORDER — POTASSIUM CHLORIDE 750 MG/1
CAPSULE, EXTENDED RELEASE ORAL
COMMUNITY
Start: 2021-01-17 | End: 2021-01-19

## 2021-01-20 RX ORDER — INSULIN LISPRO 100 [IU]/ML
10 INJECTION, SOLUTION INTRAVENOUS; SUBCUTANEOUS
Qty: 3 ML | Refills: 8 | Status: SHIPPED | OUTPATIENT
Start: 2021-01-20

## 2021-01-20 RX ORDER — BLOOD-GLUCOSE METER
KIT MISCELLANEOUS
COMMUNITY
Start: 2021-01-17 | End: 2021-01-20 | Stop reason: SDUPTHER

## 2021-01-20 RX ORDER — BLOOD-GLUCOSE METER
1 KIT MISCELLANEOUS PRN
Qty: 160 STRIP | Refills: 6 | Status: SHIPPED | OUTPATIENT
Start: 2021-01-20 | End: 2023-01-03 | Stop reason: SDUPTHER

## 2021-01-20 RX ORDER — INSULIN GLARGINE 100 [IU]/ML
12 INJECTION, SOLUTION SUBCUTANEOUS EVERY EVENING
Qty: 3 ML | Refills: 6 | Status: SHIPPED | OUTPATIENT
Start: 2021-01-20

## 2021-01-20 RX ORDER — LANCETS 28 GAUGE
1 EACH MISCELLANEOUS
Qty: 160 EACH | Refills: 6 | Status: SHIPPED | OUTPATIENT
Start: 2021-01-20

## 2021-01-20 RX ORDER — LANCETS 28 GAUGE
EACH MISCELLANEOUS
COMMUNITY
Start: 2021-01-17 | End: 2021-01-20 | Stop reason: SDUPTHER

## 2021-01-20 ASSESSMENT — PATIENT HEALTH QUESTIONNAIRE - PHQ9: CLINICAL INTERPRETATION OF PHQ2 SCORE: 0

## 2021-01-20 ASSESSMENT — FIBROSIS 4 INDEX: FIB4 SCORE: 0.55

## 2021-01-20 NOTE — PATIENT INSTRUCTIONS
"Carbohydrate Counting for Diabetes Mellitus, Adult    Carbohydrate counting is a method of keeping track of how many carbohydrates you eat. Eating carbohydrates naturally increases the amount of sugar (glucose) in the blood. Counting how many carbohydrates you eat helps keep your blood glucose within normal limits, which helps you manage your diabetes (diabetes mellitus).  It is important to know how many carbohydrates you can safely have in each meal. This is different for every person. A diet and nutrition specialist (registered dietitian) can help you make a meal plan and calculate how many carbohydrates you should have at each meal and snack.  Carbohydrates are found in the following foods:  · Grains, such as breads and cereals.  · Dried beans and soy products.  · Starchy vegetables, such as potatoes, peas, and corn.  · Fruit and fruit juices.  · Milk and yogurt.  · Sweets and snack foods, such as cake, cookies, candy, chips, and soft drinks.  How do I count carbohydrates?  There are two ways to count carbohydrates in food. You can use either of the methods or a combination of both.  Reading \"Nutrition Facts\" on packaged food  The \"Nutrition Facts\" list is included on the labels of almost all packaged foods and beverages in the [place] It includes:  · The serving size.  · Information about nutrients in each serving, including the grams (g) of carbohydrate per serving.  To use the “Nutrition Facts\":  · Decide how many servings you will have.  · Multiply the number of servings by the number of carbohydrates per serving.  · The resulting number is the total amount of carbohydrates that you will be having.  Learning standard serving sizes of other foods  When you eat carbohydrate foods that are not packaged or do not include \"Nutrition Facts\" on the label, you need to measure the servings in order to count the amount of carbohydrates:  · Measure the foods that you will eat with a food scale or measuring cup, if " needed.  · Decide how many standard-size servings you will eat.  · Multiply the number of servings by 15. Most carbohydrate-rich foods have about 15 g of carbohydrates per serving.  ? For example, if you eat 8 oz (170 g) of strawberries, you will have eaten 2 servings and 30 g of carbohydrates (2 servings x 15 g = 30 g).  · For foods that have more than one food mixed, such as soups and casseroles, you must count the carbohydrates in each food that is included.  The following list contains standard serving sizes of common carbohydrate-rich foods. Each of these servings has about 15 g of carbohydrates:  · ½ hamburger bun or ½ English muffin.  · ½ oz (15 mL) syrup.  · ½ oz (14 g) jelly.  · 1 slice of bread.  · 1 six-inch tortilla.  · 3 oz (85 g) cooked rice or pasta.  · 4 oz (113 g) cooked dried beans.  · 4 oz (113 g) starchy vegetable, such as peas, corn, or potatoes.  · 4 oz (113 g) hot cereal.  · 4 oz (113 g) mashed potatoes or ¼ of a large baked potato.  · 4 oz (113 g) canned or frozen fruit.  · 4 oz (120 mL) fruit juice.  · 4-6 crackers.  · 6 chicken nuggets.  · 6 oz (170 g) unsweetened dry cereal.  · 6 oz (170 g) plain fat-free yogurt or yogurt sweetened with artificial sweeteners.  · 8 oz (240 mL) milk.  · 8 oz (170 g) fresh fruit or one small piece of fruit.  · 24 oz (680 g) popped popcorn.  Example of carbohydrate counting  Sample meal  · 3 oz (85 g) chicken breast.  · 6 oz (170 g) brown rice.  · 4 oz (113 g) corn.  · 8 oz (240 mL) milk.  · 8 oz (170 g) strawberries with sugar-free whipped topping.  Carbohydrate calculation  1. Identify the foods that contain carbohydrates:  ? Rice.  ? Corn.  ? Milk.  ? Strawberries.  2. Calculate how many servings you have of each food:  ? 2 servings rice.  ? 1 serving corn.  ? 1 serving milk.  ? 1 serving strawberries.  3. Multiply each number of servings by 15 g:  ? 2 servings rice x 15 g = 30 g.  ? 1 serving corn x 15 g = 15 g.  ? 1 serving milk x 15 g = 15 g.  ? 1  serving strawberries x 15 g = 15 g.  4. Add together all of the amounts to find the total grams of carbohydrates eaten:  ? 30 g + 15 g + 15 g + 15 g = 75 g of carbohydrates total.  Summary  · Carbohydrate counting is a method of keeping track of how many carbohydrates you eat.  · Eating carbohydrates naturally increases the amount of sugar (glucose) in the blood.  · Counting how many carbohydrates you eat helps keep your blood glucose within normal limits, which helps you manage your diabetes.  · A diet and nutrition specialist (registered dietitian) can help you make a meal plan and calculate how many carbohydrates you should have at each meal and snack.  This information is not intended to replace advice given to you by your health care provider. Make sure you discuss any questions you have with your health care provider.  Document Released: 12/18/2006 Document Revised: 07/12/2018 Document Reviewed: 05/31/2017  Elsevier Patient Education © 2020 Elsevier Inc.

## 2021-01-20 NOTE — ASSESSMENT & PLAN NOTE
The patient was recently hospitalized at [facility], from January 14 to January 18, for DKA.  The patient is a newly diagnosed type I diabetic.  He is here today for hospital follow-up and follow-up for his new diabetes.  Currently the patient is checking his blood sugars at home, his morning sugars are in the 200-220 range.  His evening sugars go as high as 240.  The patient was unable to speak with the diabetes educator at [facility], because they are unavailable on the weekend.  He was discharged on January 18 on 12 units of Lantus in the morning, and sliding scale Humalog.  The hospitalist recommendation on discharge was to refer patient to endocrinology as well as follow-up with primary care and begin patient on Metformin.

## 2021-01-20 NOTE — PROGRESS NOTES
Subjective:     CC: Type 1 diabetes,, new diagnosis.    HPI:   Ho presents today as a new patient to me.  He was recently hospitalized from January 14 through January 18 for DKA.  Patient has many questions about management of this new diagnosis.  Both he and his wife would like education with diabetes educator, as well as information on options available to them such as insulin pump versus management with carb counting, sliding scale, long-acting insulin.  Overall, the patient is feeling much better than prior to admission.  His dad presents to the appointment with him today.    Type 1 diabetes mellitus without complication (HCC)  The patient was recently hospitalized at [facility], from January 14 to January 18, for DKA.  The patient is a newly diagnosed type I diabetic.  He is here today for hospital follow-up and follow-up for his new diabetes.  Currently the patient is checking his blood sugars at home, his morning sugars are in the 200-220 range.  His evening sugars go as high as 240.  The patient was unable to speak with the diabetes educator at [facility], because they are unavailable on the weekend.      Past Medical History:   Diagnosis Date   • DKA (diabetic ketoacidoses) (HCC) 1/15/2021   • Vaso-vagal reaction 9/30/2014       Social History     Tobacco Use   • Smoking status: Never Smoker   • Smokeless tobacco: Former User     Types: Chew   Substance Use Topics   • Alcohol use: Yes     Comment: minimal   • Drug use: No       Current Outpatient Medications Ordered in Epic   Medication Sig Dispense Refill   • Blood Glucose Monitoring Suppl (FREESTYLE LITE) Device      • BD PEN NEEDLE TIFFANI U/F      • insulin glargine (INSULIN GLARGINE) 100 UNIT/ML Solution Pen-injector injection Inject 12 Units under the skin every evening. 3 mL 6   • insulin lispro (HUMALOG) 100 UNIT/ML Solution Pen-injector injection PEN Inject 10 Units under the skin 4 Times a Day,Before Meals and at Bedtime. 3 mL 8   •  FreeStyle Lancets Misc Apply 1 Strip topically 4 Times a Day,Before Meals and at Bedtime. 160 Each 6   • FREESTYLE LITE strip 1 Strip by Other route as needed. 160 Strip 6   • IBUPROFEN PO Take 600 mg by mouth.     • cetirizine (ZYRTEC) 10 MG TABS Take  by mouth every day.     • insulin lispro (HUMALOG) 100 UNIT/ML Inject 5 Units under the skin 3 times a day before meals. (Patient not taking: Reported on 1/20/2021) 10 mL 0   • Probiotic Product (PROBIOTIC-10 PO) Take  by mouth.     • docusate sodium (COLACE) 100 MG Cap Take 100 mg by mouth 2 times a day.       No current [facility]-ordered facility-administered medications on file.        Allergies:  Patient has no known allergies.    Health Maintenance: Deferred.     ROS:  Gen: no fevers/chills, no changes in weight  Eyes: no changes in vision  ENT: no sore throat, no hearing loss, no bloody nose  Pulm: no sob, no cough  CV: no chest pain, no palpitations  GI: no nausea/vomiting, no diarrhea  : no dysuria  MSk: no myalgias  Skin: no rash  Neuro: no headaches, no numbness/tingling  Heme/Lymph: no easy bruising      Objective:       Exam:  /62 (BP Location: Left arm, Patient Position: Sitting, BP Cuff Size: Adult)   Pulse 71   Temp 37.1 °C (98.8 °F) (Temporal)   Resp 14   Ht 1.829 m (6')   Wt 87.1 kg (192 lb)   SpO2 99%   BMI 26.04 kg/m²  Body mass index is 26.04 kg/m².    Gen: Alert and oriented, No apparent distress.  Neck: Neck is supple without lymphadenopathy.  Lungs: Normal effort, CTA bilaterally, no wheezes, rhonchi, or rales  CV: Regular rate and rhythm. No murmurs, rubs, or gallops.  Ext: No clubbing, cyanosis, edema.    Labs: Labs performed during his hospitalization were reviewed today.  Patient had an initial A1c of 11.7.  Upon discharge his potassium was noted to be 3.1.     Assessment & Plan:     25 y.o. male with the following -     1. Type 1 diabetes mellitus without complication (HCC)  Discussed with patient that we will add carb counting  to his regimen.  This is the only change that I would like to make to his regimen until he is seen by endocrinology.  Patient was educated today about management of type 1 diabetes by our staff RN, Piedad.  We will also schedule diabetes education appointment for patient and his wife as well as education appointment with pharmacy for patient and his wife.  Additional referrals were placed to both diabetic educator and endocrinology urgently.  Patient will also get a repeat CBC and CMP today to check status of his electrolytes and blood indices.  - insulin glargine (INSULIN GLARGINE) 100 UNIT/ML Solution Pen-injector injection; Inject 12 Units under the skin every evening.  Dispense: 3 mL; Refill: 6  - insulin lispro (HUMALOG) 100 UNIT/ML Solution Pen-injector injection PEN; Inject 10 Units under the skin 4 Times a Day,Before Meals and at Bedtime.  Dispense: 3 mL; Refill: 8  - FreeStyle Lancets Misc; Apply 1 Strip topically 4 Times a Day,Before Meals and at Bedtime.  Dispense: 160 Each; Refill: 6  - FREESTYLE LITE strip; 1 Strip by Other route as needed.  Dispense: 160 Strip; Refill: 6  - REFERRAL TO OPHTHALMOLOGY  - REFERRAL TO DIABETIC EDUCATION  - REFERRAL TO ENDOCRINOLOGY  - CBC WITH DIFFERENTIAL; Future  - Comp Metabolic Panel; Future    Additional time was spent coordinating referrals, scheduling with pharmacist, and an office RN education.    Patient will follow up based on his lab results, and after he sees endocrinology.  Sooner if need be    Please note that this dictation was created using voice recognition software. I have made every reasonable attempt to correct obvious errors, but I expect that there are errors of grammar and possibly content that I did not discover before finalizing the note.

## 2021-01-28 ENCOUNTER — NON-PROVIDER VISIT (OUTPATIENT)
Dept: HEALTH INFORMATION MANAGEMENT | Facility: MEDICAL CENTER | Age: 26
End: 2021-01-28
Payer: COMMERCIAL

## 2021-01-28 PROCEDURE — G0108 DIAB MANAGE TRN  PER INDIV: HCPCS | Performed by: INTERNAL MEDICINE

## 2021-01-28 NOTE — PROGRESS NOTES
Ho is a new diagnosis of Type 1 diabetes, he has a brother with Type 1 diabetes and so the concepts are not foreign to him.   He brought his wife to the appointment primarily for her to learn more about diabetes.   Reviewed carbohydrate counting, reading food labels, apps on phone to look up values.   He is currently taking a base dose of 5 units of Humalog and 1:50 over 200 correction scale.    Reviewed insulin action of both Humalog and Lantus, rotation of sites, storage of insulin.   Reviewed sick day guidelines, DKA and when to seek medical attention.   Discussed hypoglycemia, signs symptoms and treatment.    Time spent is greater than 60 minutes to direct face to face time on education.

## 2021-02-08 ENCOUNTER — HOSPITAL ENCOUNTER (OUTPATIENT)
Facility: MEDICAL CENTER | Age: 26
End: 2021-02-08
Attending: PHYSICIAN ASSISTANT
Payer: COMMERCIAL

## 2021-02-08 PROCEDURE — 84443 ASSAY THYROID STIM HORMONE: CPT

## 2021-02-08 PROCEDURE — 84439 ASSAY OF FREE THYROXINE: CPT

## 2021-02-08 PROCEDURE — 86341 ISLET CELL ANTIBODY: CPT

## 2021-02-08 PROCEDURE — 80053 COMPREHEN METABOLIC PANEL: CPT

## 2021-02-08 PROCEDURE — 84681 ASSAY OF C-PEPTIDE: CPT

## 2021-02-09 LAB
ALBUMIN SERPL BCP-MCNC: 4.2 G/DL (ref 3.2–4.9)
ALBUMIN/GLOB SERPL: 1.7 G/DL
ALP SERPL-CCNC: 96 U/L (ref 30–99)
ALT SERPL-CCNC: 21 U/L (ref 2–50)
ANION GAP SERPL CALC-SCNC: 12 MMOL/L (ref 7–16)
AST SERPL-CCNC: 18 U/L (ref 12–45)
BILIRUB SERPL-MCNC: 0.3 MG/DL (ref 0.1–1.5)
BUN SERPL-MCNC: 17 MG/DL (ref 8–22)
CALCIUM SERPL-MCNC: 9 MG/DL (ref 8.5–10.5)
CHLORIDE SERPL-SCNC: 104 MMOL/L (ref 96–112)
CO2 SERPL-SCNC: 22 MMOL/L (ref 20–33)
CREAT SERPL-MCNC: 0.65 MG/DL (ref 0.5–1.4)
GLOBULIN SER CALC-MCNC: 2.5 G/DL (ref 1.9–3.5)
GLUCOSE SERPL-MCNC: 125 MG/DL (ref 65–99)
POTASSIUM SERPL-SCNC: 4 MMOL/L (ref 3.6–5.5)
PROT SERPL-MCNC: 6.7 G/DL (ref 6–8.2)
SODIUM SERPL-SCNC: 138 MMOL/L (ref 135–145)
T4 FREE SERPL-MCNC: 0.92 NG/DL (ref 0.93–1.7)
TSH SERPL DL<=0.005 MIU/L-ACNC: 1.21 UIU/ML (ref 0.38–5.33)

## 2021-02-10 LAB — C PEPTIDE SERPL-MCNC: 0.7 NG/ML (ref 0.8–3.5)

## 2021-02-11 LAB — GAD65 AB SER IA-ACNC: 20 IU/ML (ref 0–5)

## 2021-02-19 ENCOUNTER — HOSPITAL ENCOUNTER (OUTPATIENT)
Dept: LAB | Facility: MEDICAL CENTER | Age: 26
End: 2021-02-19
Attending: NURSE PRACTITIONER
Payer: COMMERCIAL

## 2021-02-19 LAB
CREAT UR-MCNC: 66.37 MG/DL
MICROALBUMIN UR-MCNC: <1.2 MG/DL
MICROALBUMIN/CREAT UR: NORMAL MG/G (ref 0–30)

## 2021-02-19 PROCEDURE — 86341 ISLET CELL ANTIBODY: CPT

## 2021-02-19 PROCEDURE — 82570 ASSAY OF URINE CREATININE: CPT

## 2021-02-19 PROCEDURE — 82043 UR ALBUMIN QUANTITATIVE: CPT

## 2021-02-19 PROCEDURE — 83525 ASSAY OF INSULIN: CPT

## 2021-02-19 PROCEDURE — 36415 COLL VENOUS BLD VENIPUNCTURE: CPT

## 2021-02-22 LAB
INSULIN P FAST SERPL-ACNC: 36 UIU/ML (ref 3–19)
PANC ISLET CELL AB TITR SER: NORMAL {TITER}

## 2021-04-27 ENCOUNTER — APPOINTMENT (OUTPATIENT)
Dept: ENDOCRINOLOGY | Facility: MEDICAL CENTER | Age: 26
End: 2021-04-27
Attending: INTERNAL MEDICINE
Payer: COMMERCIAL

## 2021-05-14 LAB — HBA1C MFR BLD: 6 % (ref 0–5.6)

## 2021-09-16 ENCOUNTER — HOSPITAL ENCOUNTER (OUTPATIENT)
Dept: LAB | Facility: MEDICAL CENTER | Age: 26
End: 2021-09-16
Attending: PHYSICIAN ASSISTANT
Payer: COMMERCIAL

## 2021-09-16 LAB
ALBUMIN SERPL BCP-MCNC: 4.4 G/DL (ref 3.2–4.9)
ALBUMIN/GLOB SERPL: 1.4 G/DL
ALP SERPL-CCNC: 72 U/L (ref 30–99)
ALT SERPL-CCNC: 15 U/L (ref 2–50)
ANION GAP SERPL CALC-SCNC: 10 MMOL/L (ref 7–16)
AST SERPL-CCNC: 18 U/L (ref 12–45)
BILIRUB SERPL-MCNC: 0.6 MG/DL (ref 0.1–1.5)
BUN SERPL-MCNC: 19 MG/DL (ref 8–22)
CALCIUM SERPL-MCNC: 9.8 MG/DL (ref 8.5–10.5)
CHLORIDE SERPL-SCNC: 104 MMOL/L (ref 96–112)
CO2 SERPL-SCNC: 24 MMOL/L (ref 20–33)
CREAT SERPL-MCNC: 0.85 MG/DL (ref 0.5–1.4)
FASTING STATUS PATIENT QL REPORTED: NORMAL
GLOBULIN SER CALC-MCNC: 3.1 G/DL (ref 1.9–3.5)
GLUCOSE SERPL-MCNC: 123 MG/DL (ref 65–99)
POTASSIUM SERPL-SCNC: 5 MMOL/L (ref 3.6–5.5)
PROT SERPL-MCNC: 7.5 G/DL (ref 6–8.2)
SODIUM SERPL-SCNC: 138 MMOL/L (ref 135–145)
T4 FREE SERPL-MCNC: 1.17 NG/DL (ref 0.93–1.7)
TSH SERPL DL<=0.005 MIU/L-ACNC: 1.25 UIU/ML (ref 0.38–5.33)

## 2021-09-16 PROCEDURE — 80053 COMPREHEN METABOLIC PANEL: CPT

## 2021-09-16 PROCEDURE — 84443 ASSAY THYROID STIM HORMONE: CPT

## 2021-09-16 PROCEDURE — 84681 ASSAY OF C-PEPTIDE: CPT

## 2021-09-16 PROCEDURE — 84439 ASSAY OF FREE THYROXINE: CPT

## 2021-09-16 PROCEDURE — 36415 COLL VENOUS BLD VENIPUNCTURE: CPT

## 2021-09-18 LAB — C PEPTIDE SERPL-MCNC: 0.6 NG/ML (ref 0.5–3.3)

## 2022-06-07 NOTE — ED TRIAGE NOTES
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 Having throat and nose discomfort. Symptoms are resolving. Pt A&Ox4 and ambulatory.     Patient masked. No respiratory symptoms, no recent travel, denies known COVID exposure.     
No

## 2023-01-03 DIAGNOSIS — E10.9 TYPE 1 DIABETES MELLITUS WITHOUT COMPLICATION (HCC): ICD-10-CM

## 2023-01-08 RX ORDER — BLOOD-GLUCOSE METER
1 KIT MISCELLANEOUS PRN
Qty: 160 STRIP | Refills: 0 | Status: SHIPPED | OUTPATIENT
Start: 2023-01-08

## 2023-02-09 ENCOUNTER — HOSPITAL ENCOUNTER (OUTPATIENT)
Dept: LAB | Facility: MEDICAL CENTER | Age: 28
End: 2023-02-09
Attending: PHYSICIAN ASSISTANT
Payer: COMMERCIAL

## 2023-02-09 LAB
25(OH)D3 SERPL-MCNC: 19 NG/ML (ref 30–100)
ALBUMIN SERPL BCP-MCNC: 4.6 G/DL (ref 3.2–4.9)
ALBUMIN/GLOB SERPL: 1.6 G/DL
ALP SERPL-CCNC: 67 U/L (ref 30–99)
ALT SERPL-CCNC: 22 U/L (ref 2–50)
ANION GAP SERPL CALC-SCNC: 8 MMOL/L (ref 7–16)
AST SERPL-CCNC: 17 U/L (ref 12–45)
BILIRUB SERPL-MCNC: 0.5 MG/DL (ref 0.1–1.5)
BUN SERPL-MCNC: 14 MG/DL (ref 8–22)
CALCIUM ALBUM COR SERPL-MCNC: 9.1 MG/DL (ref 8.5–10.5)
CALCIUM SERPL-MCNC: 9.6 MG/DL (ref 8.5–10.5)
CHLORIDE SERPL-SCNC: 102 MMOL/L (ref 96–112)
CHOLEST SERPL-MCNC: 163 MG/DL (ref 100–199)
CO2 SERPL-SCNC: 27 MMOL/L (ref 20–33)
CREAT SERPL-MCNC: 0.72 MG/DL (ref 0.5–1.4)
CREAT UR-MCNC: 76.59 MG/DL
EST. AVERAGE GLUCOSE BLD GHB EST-MCNC: 192 MG/DL
FASTING STATUS PATIENT QL REPORTED: NORMAL
GFR SERPLBLD CREATININE-BSD FMLA CKD-EPI: 126 ML/MIN/1.73 M 2
GLOBULIN SER CALC-MCNC: 2.9 G/DL (ref 1.9–3.5)
GLUCOSE SERPL-MCNC: 184 MG/DL (ref 65–99)
HBA1C MFR BLD: 8.3 % (ref 4–5.6)
HDLC SERPL-MCNC: 44 MG/DL
LDLC SERPL CALC-MCNC: 104 MG/DL
MICROALBUMIN UR-MCNC: <1.2 MG/DL
MICROALBUMIN/CREAT UR: NORMAL MG/G (ref 0–30)
POTASSIUM SERPL-SCNC: 4.8 MMOL/L (ref 3.6–5.5)
PROT SERPL-MCNC: 7.5 G/DL (ref 6–8.2)
SODIUM SERPL-SCNC: 137 MMOL/L (ref 135–145)
T4 FREE SERPL-MCNC: 1.4 NG/DL (ref 0.93–1.7)
TRIGL SERPL-MCNC: 73 MG/DL (ref 0–149)
TSH SERPL DL<=0.005 MIU/L-ACNC: 1.72 UIU/ML (ref 0.38–5.33)

## 2023-02-09 PROCEDURE — 80053 COMPREHEN METABOLIC PANEL: CPT

## 2023-02-09 PROCEDURE — 80061 LIPID PANEL: CPT

## 2023-02-09 PROCEDURE — 36415 COLL VENOUS BLD VENIPUNCTURE: CPT

## 2023-02-09 PROCEDURE — 83036 HEMOGLOBIN GLYCOSYLATED A1C: CPT

## 2023-02-09 PROCEDURE — 82306 VITAMIN D 25 HYDROXY: CPT

## 2023-02-09 PROCEDURE — 82043 UR ALBUMIN QUANTITATIVE: CPT

## 2023-02-09 PROCEDURE — 84443 ASSAY THYROID STIM HORMONE: CPT

## 2023-02-09 PROCEDURE — 84439 ASSAY OF FREE THYROXINE: CPT

## 2023-02-09 PROCEDURE — 82570 ASSAY OF URINE CREATININE: CPT

## 2023-09-20 ENCOUNTER — HOSPITAL ENCOUNTER (OUTPATIENT)
Dept: LAB | Facility: MEDICAL CENTER | Age: 28
End: 2023-09-20
Attending: INTERNAL MEDICINE
Payer: COMMERCIAL

## 2023-09-20 LAB
CHOLEST SERPL-MCNC: 162 MG/DL (ref 100–199)
EST. AVERAGE GLUCOSE BLD GHB EST-MCNC: 169 MG/DL
FASTING STATUS PATIENT QL REPORTED: NORMAL
HBA1C MFR BLD: 7.5 % (ref 4–5.6)
HDLC SERPL-MCNC: 35 MG/DL
LDLC SERPL CALC-MCNC: 109 MG/DL
TRIGL SERPL-MCNC: 89 MG/DL (ref 0–149)

## 2023-09-20 PROCEDURE — 80061 LIPID PANEL: CPT

## 2023-09-20 PROCEDURE — 36415 COLL VENOUS BLD VENIPUNCTURE: CPT

## 2023-09-20 PROCEDURE — 83036 HEMOGLOBIN GLYCOSYLATED A1C: CPT

## 2023-12-21 ENCOUNTER — HOSPITAL ENCOUNTER (OUTPATIENT)
Dept: LAB | Facility: MEDICAL CENTER | Age: 28
End: 2023-12-21
Attending: INTERNAL MEDICINE
Payer: COMMERCIAL

## 2023-12-21 LAB
CHOLEST SERPL-MCNC: 149 MG/DL (ref 100–199)
EST. AVERAGE GLUCOSE BLD GHB EST-MCNC: 163 MG/DL
FASTING STATUS PATIENT QL REPORTED: NORMAL
HBA1C MFR BLD: 7.3 % (ref 4–5.6)
HDLC SERPL-MCNC: 40 MG/DL
LDLC SERPL CALC-MCNC: 90 MG/DL
TRIGL SERPL-MCNC: 94 MG/DL (ref 0–149)

## 2023-12-21 PROCEDURE — 36415 COLL VENOUS BLD VENIPUNCTURE: CPT

## 2023-12-21 PROCEDURE — 80061 LIPID PANEL: CPT

## 2023-12-21 PROCEDURE — 83036 HEMOGLOBIN GLYCOSYLATED A1C: CPT

## 2024-05-01 NOTE — CARE PLAN
Problem: Communication  Goal: The ability to communicate needs accurately and effectively will improve  Outcome: PROGRESSING AS EXPECTED  Updated whiteboard in patient room and discussed POC. Pt verbalized understanding of diabetic and medication teaching. All questions answered.      Problem: Knowledge Deficit  Goal: Knowledge of disease process/condition, treatment plan, diagnostic tests, and medications will improve  Outcome: PROGRESSING AS EXPECTED   Educated patient on diabetes diagnosis and associated medications. Patient verbalized understanding by identifying medication, types, and how much to self administer. Discussed method of administration and indications for administration. All questions answered.  
no

## 2024-07-22 ENCOUNTER — HOSPITAL ENCOUNTER (OUTPATIENT)
Dept: LAB | Facility: MEDICAL CENTER | Age: 29
End: 2024-07-22
Attending: STUDENT IN AN ORGANIZED HEALTH CARE EDUCATION/TRAINING PROGRAM
Payer: COMMERCIAL

## 2024-07-22 LAB
ALBUMIN SERPL BCP-MCNC: 4.1 G/DL (ref 3.2–4.9)
ALBUMIN/GLOB SERPL: 1.6 G/DL
ALP SERPL-CCNC: 81 U/L (ref 30–99)
ALT SERPL-CCNC: 24 U/L (ref 2–50)
ANION GAP SERPL CALC-SCNC: 11 MMOL/L (ref 7–16)
AST SERPL-CCNC: 33 U/L (ref 12–45)
BILIRUB SERPL-MCNC: 0.2 MG/DL (ref 0.1–1.5)
BUN SERPL-MCNC: 10 MG/DL (ref 8–22)
CALCIUM ALBUM COR SERPL-MCNC: 9.2 MG/DL (ref 8.5–10.5)
CALCIUM SERPL-MCNC: 9.3 MG/DL (ref 8.5–10.5)
CHLORIDE SERPL-SCNC: 105 MMOL/L (ref 96–112)
CO2 SERPL-SCNC: 24 MMOL/L (ref 20–33)
CREAT SERPL-MCNC: 0.57 MG/DL (ref 0.5–1.4)
GFR SERPLBLD CREATININE-BSD FMLA CKD-EPI: 134 ML/MIN/1.73 M 2
GLOBULIN SER CALC-MCNC: 2.5 G/DL (ref 1.9–3.5)
GLUCOSE SERPL-MCNC: 171 MG/DL (ref 65–99)
POTASSIUM SERPL-SCNC: 4.8 MMOL/L (ref 3.6–5.5)
PROT SERPL-MCNC: 6.6 G/DL (ref 6–8.2)
SODIUM SERPL-SCNC: 140 MMOL/L (ref 135–145)

## 2024-07-22 PROCEDURE — 36415 COLL VENOUS BLD VENIPUNCTURE: CPT

## 2024-07-22 PROCEDURE — 86341 ISLET CELL ANTIBODY: CPT

## 2024-07-22 PROCEDURE — 86337 INSULIN ANTIBODIES: CPT

## 2024-07-22 PROCEDURE — 80053 COMPREHEN METABOLIC PANEL: CPT

## 2024-07-22 PROCEDURE — 84681 ASSAY OF C-PEPTIDE: CPT

## 2024-07-24 LAB
C PEPTIDE SERPL-MCNC: 0.2 NG/ML (ref 0.5–3.3)
PANC ISLET CELL AB TITR SER: NORMAL {TITER}

## 2024-07-25 LAB
GAD65 AB SER IA-ACNC: 6.4 IU/ML (ref 0–5)
ISLET CELL512 AB SER IA-ACNC: <5.4 U/ML (ref 0–7.4)

## 2024-07-27 LAB
INSULIN HUMAN AB SER-ACNC: 9.1 U/ML (ref 0–0.4)
ZNT8 AB SERPL IA-ACNC: 33.9 U/ML (ref 0–15)

## 2024-10-22 ENCOUNTER — HOSPITAL ENCOUNTER (OUTPATIENT)
Dept: LAB | Facility: MEDICAL CENTER | Age: 29
End: 2024-10-22
Attending: STUDENT IN AN ORGANIZED HEALTH CARE EDUCATION/TRAINING PROGRAM
Payer: COMMERCIAL

## 2024-10-22 LAB
EST. AVERAGE GLUCOSE BLD GHB EST-MCNC: 180 MG/DL
HBA1C MFR BLD: 7.9 % (ref 4–5.6)
T4 FREE SERPL-MCNC: 1.12 NG/DL (ref 0.93–1.7)
TSH SERPL-ACNC: 2.02 UIU/ML (ref 0.35–5.5)

## 2024-10-22 PROCEDURE — 83036 HEMOGLOBIN GLYCOSYLATED A1C: CPT

## 2024-10-22 PROCEDURE — 84439 ASSAY OF FREE THYROXINE: CPT

## 2024-10-22 PROCEDURE — 36415 COLL VENOUS BLD VENIPUNCTURE: CPT

## 2024-10-22 PROCEDURE — 84443 ASSAY THYROID STIM HORMONE: CPT

## 2025-07-07 ENCOUNTER — HOSPITAL ENCOUNTER (OUTPATIENT)
Facility: MEDICAL CENTER | Age: 30
End: 2025-07-07
Attending: STUDENT IN AN ORGANIZED HEALTH CARE EDUCATION/TRAINING PROGRAM
Payer: COMMERCIAL

## 2025-07-07 LAB
ALBUMIN SERPL BCP-MCNC: 4.3 G/DL (ref 3.2–4.9)
ALBUMIN/GLOB SERPL: 1.4 G/DL
ALP SERPL-CCNC: 86 U/L (ref 30–99)
ALT SERPL-CCNC: 33 U/L (ref 2–50)
ANION GAP SERPL CALC-SCNC: 11 MMOL/L (ref 7–16)
AST SERPL-CCNC: 24 U/L (ref 12–45)
BILIRUB SERPL-MCNC: 0.5 MG/DL (ref 0.1–1.5)
BUN SERPL-MCNC: 6 MG/DL (ref 8–22)
CALCIUM ALBUM COR SERPL-MCNC: 9 MG/DL (ref 8.5–10.5)
CALCIUM SERPL-MCNC: 9.2 MG/DL (ref 8.5–10.5)
CHLORIDE SERPL-SCNC: 103 MMOL/L (ref 96–112)
CHOLEST SERPL-MCNC: 138 MG/DL (ref 100–199)
CO2 SERPL-SCNC: 25 MMOL/L (ref 20–33)
CREAT SERPL-MCNC: 0.75 MG/DL (ref 0.5–1.4)
CREAT UR-MCNC: 43.8 MG/DL
ERYTHROCYTE [DISTWIDTH] IN BLOOD BY AUTOMATED COUNT: 40.8 FL (ref 35.9–50)
FASTING STATUS PATIENT QL REPORTED: NORMAL
GFR SERPLBLD CREATININE-BSD FMLA CKD-EPI: 123 ML/MIN/1.73 M 2
GLOBULIN SER CALC-MCNC: 3 G/DL (ref 1.9–3.5)
GLUCOSE SERPL-MCNC: 137 MG/DL (ref 65–99)
HCT VFR BLD AUTO: 44.1 % (ref 42–52)
HDLC SERPL-MCNC: 33 MG/DL
HGB BLD-MCNC: 15.2 G/DL (ref 14–18)
LDLC SERPL CALC-MCNC: 91 MG/DL
MCH RBC QN AUTO: 31.5 PG (ref 27–33)
MCHC RBC AUTO-ENTMCNC: 34.5 G/DL (ref 32.3–36.5)
MCV RBC AUTO: 91.3 FL (ref 81.4–97.8)
MICROALBUMIN UR-MCNC: <1.2 MG/DL
MICROALBUMIN/CREAT UR: NORMAL MG/G (ref 0–30)
PLATELET # BLD AUTO: 290 K/UL (ref 164–446)
PMV BLD AUTO: 9.3 FL (ref 9–12.9)
POTASSIUM SERPL-SCNC: 3.9 MMOL/L (ref 3.6–5.5)
PROT SERPL-MCNC: 7.3 G/DL (ref 6–8.2)
RBC # BLD AUTO: 4.83 M/UL (ref 4.7–6.1)
SODIUM SERPL-SCNC: 139 MMOL/L (ref 135–145)
T4 FREE SERPL-MCNC: 1.08 NG/DL (ref 0.93–1.7)
TRIGL SERPL-MCNC: 70 MG/DL (ref 0–149)
TSH SERPL DL<=0.005 MIU/L-ACNC: 1.61 UIU/ML (ref 0.38–5.33)
WBC # BLD AUTO: 5.6 K/UL (ref 4.8–10.8)

## 2025-07-07 PROCEDURE — 80053 COMPREHEN METABOLIC PANEL: CPT

## 2025-07-07 PROCEDURE — 82043 UR ALBUMIN QUANTITATIVE: CPT

## 2025-07-07 PROCEDURE — 82570 ASSAY OF URINE CREATININE: CPT

## 2025-07-07 PROCEDURE — 80061 LIPID PANEL: CPT

## 2025-07-07 PROCEDURE — 84443 ASSAY THYROID STIM HORMONE: CPT

## 2025-07-07 PROCEDURE — 85027 COMPLETE CBC AUTOMATED: CPT

## 2025-07-07 PROCEDURE — 82985 ASSAY OF GLYCATED PROTEIN: CPT

## 2025-07-07 PROCEDURE — 84439 ASSAY OF FREE THYROXINE: CPT

## 2025-07-07 PROCEDURE — 36415 COLL VENOUS BLD VENIPUNCTURE: CPT

## 2025-07-09 LAB — FRUCTOSAMINE SERPL-SCNC: 309 UMOL/L (ref 205–285)
